# Patient Record
Sex: MALE | Race: WHITE | NOT HISPANIC OR LATINO | Employment: OTHER | URBAN - METROPOLITAN AREA
[De-identification: names, ages, dates, MRNs, and addresses within clinical notes are randomized per-mention and may not be internally consistent; named-entity substitution may affect disease eponyms.]

---

## 2023-02-12 PROBLEM — H26.491 POSTERIOR CAPSULAR OPACIFICATION VISUALLY SIGNIFICANT OF RIGHT EYE: Status: ACTIVE | Noted: 2023-02-12

## 2023-02-12 PROBLEM — R30.0 DYSURIA: Status: ACTIVE | Noted: 2023-02-12

## 2023-02-12 PROBLEM — Z98.42 HISTORY OF YAG LASER CAPSULOTOMY OF LENS OF LEFT EYE: Status: ACTIVE | Noted: 2023-02-12

## 2023-02-12 PROBLEM — S02.30XA: Status: ACTIVE | Noted: 2023-02-12

## 2023-02-12 PROBLEM — E61.1 IRON DEFICIENCY: Status: ACTIVE | Noted: 2023-02-12

## 2023-02-12 PROBLEM — Z96.1 PSEUDOPHAKIA OF RIGHT EYE: Status: ACTIVE | Noted: 2023-02-12

## 2023-02-12 PROBLEM — H53.2 BINOCULAR VISION DISORDER WITH DIPLOPIA: Status: ACTIVE | Noted: 2023-02-12

## 2023-02-12 PROBLEM — B96.81 HELICOBACTER POSITIVE GASTRITIS: Status: ACTIVE | Noted: 2023-02-12

## 2023-02-12 PROBLEM — H25.12 AGE-RELATED NUCLEAR CATARACT OF LEFT EYE: Status: ACTIVE | Noted: 2023-02-12

## 2023-02-12 PROBLEM — H25.041 POSTERIOR SUBCAPSULAR POLAR AGE-RELATED CATARACT OF RIGHT EYE: Status: ACTIVE | Noted: 2023-02-12

## 2023-02-12 PROBLEM — K21.9 GERD (GASTROESOPHAGEAL REFLUX DISEASE): Status: ACTIVE | Noted: 2023-02-12

## 2023-02-12 PROBLEM — E23.0 HYPOGONADOTROPIC HYPOGONADISM (MULTI): Status: ACTIVE | Noted: 2023-02-12

## 2023-02-12 PROBLEM — I10 BENIGN ESSENTIAL HYPERTENSION: Status: ACTIVE | Noted: 2023-02-12

## 2023-02-12 PROBLEM — H25.11 AGE-RELATED NUCLEAR CATARACT OF RIGHT EYE: Status: ACTIVE | Noted: 2023-02-12

## 2023-02-12 PROBLEM — E11.9 DIABETES MELLITUS (MULTI): Status: ACTIVE | Noted: 2023-02-12

## 2023-02-12 PROBLEM — Z98.41 HISTORY OF YAG LASER CAPSULOTOMY OF LENS OF RIGHT EYE: Status: ACTIVE | Noted: 2023-02-12

## 2023-02-12 PROBLEM — H50.30 XT (EXOTROPIA), INTERMITTENT: Status: ACTIVE | Noted: 2023-02-12

## 2023-02-12 PROBLEM — Z98.84 BARIATRIC SURGERY STATUS: Status: ACTIVE | Noted: 2023-02-12

## 2023-02-12 PROBLEM — E11.3299 DIABETIC RETINOPATHY, BACKGROUND (MULTI): Status: ACTIVE | Noted: 2023-02-12

## 2023-02-12 PROBLEM — T14.8XXA CONTUSION: Status: ACTIVE | Noted: 2023-02-12

## 2023-02-12 PROBLEM — H40.041 STEROID RESPONDER, RIGHT EYE: Status: ACTIVE | Noted: 2023-02-12

## 2023-02-12 PROBLEM — K90.9 INTESTINAL MALABSORPTION (HHS-HCC): Status: ACTIVE | Noted: 2023-02-12

## 2023-02-12 PROBLEM — L21.9 SEBORRHEIC DERMATITIS: Status: ACTIVE | Noted: 2023-02-12

## 2023-02-12 PROBLEM — N52.9 ERECTILE DYSFUNCTION: Status: ACTIVE | Noted: 2023-02-12

## 2023-02-12 PROBLEM — N20.0 KIDNEY STONE: Status: ACTIVE | Noted: 2023-02-12

## 2023-02-12 PROBLEM — H25.042 POSTERIOR SUBCAPSULAR POLAR AGE-RELATED CATARACT OF LEFT EYE: Status: ACTIVE | Noted: 2023-02-12

## 2023-02-12 PROBLEM — H02.839 DERMATOCHALASIS: Status: ACTIVE | Noted: 2023-02-12

## 2023-02-12 PROBLEM — Z96.1 PSEUDOPHAKIA OF LEFT EYE: Status: ACTIVE | Noted: 2023-02-12

## 2023-02-12 PROBLEM — E78.00 HYPERCHOLESTEROLEMIA: Status: ACTIVE | Noted: 2023-02-12

## 2023-02-12 PROBLEM — K29.70 HELICOBACTER POSITIVE GASTRITIS: Status: ACTIVE | Noted: 2023-02-12

## 2023-02-12 PROBLEM — H26.492 POSTERIOR CAPSULAR OPACIFICATION VISUALLY SIGNIFICANT OF LEFT EYE: Status: ACTIVE | Noted: 2023-02-12

## 2023-02-12 PROBLEM — G47.33 OBSTRUCTIVE SLEEP APNEA: Status: ACTIVE | Noted: 2023-02-12

## 2023-02-12 PROBLEM — H02.209 LAGOPHTHALMOS: Status: ACTIVE | Noted: 2023-02-12

## 2023-02-12 PROBLEM — R55 SYNCOPE: Status: ACTIVE | Noted: 2023-02-12

## 2023-02-12 PROBLEM — H50.21 HYPERTROPIA OF RIGHT EYE: Status: ACTIVE | Noted: 2023-02-12

## 2023-02-12 PROBLEM — M89.9 ABNORMAL MOVEMENT IN BONE: Status: ACTIVE | Noted: 2023-02-12

## 2023-02-12 RX ORDER — DIAZEPAM 5 MG/1
TABLET ORAL AS NEEDED
COMMUNITY
End: 2024-01-26 | Stop reason: ALTCHOICE

## 2023-02-12 RX ORDER — HYDROCORTISONE 25 MG/ML
LOTION TOPICAL 2 TIMES DAILY
COMMUNITY
Start: 2020-02-24 | End: 2023-03-20 | Stop reason: ALTCHOICE

## 2023-02-12 RX ORDER — OMEPRAZOLE 20 MG/1
20 CAPSULE, DELAYED RELEASE ORAL
COMMUNITY
Start: 2020-10-26

## 2023-02-12 RX ORDER — CHOLECALCIFEROL (VITAMIN D3) 25 MCG
25 TABLET ORAL 2 TIMES WEEKLY
COMMUNITY
Start: 2016-05-25

## 2023-02-12 RX ORDER — DIPHENHYDRAMINE HCL 25 MG
CAPSULE ORAL
COMMUNITY

## 2023-02-12 RX ORDER — RAMIPRIL 5 MG/1
1 CAPSULE ORAL DAILY
COMMUNITY
Start: 2018-08-13 | End: 2024-01-31

## 2023-02-12 RX ORDER — MAGNESIUM 200 MG
1 TABLET ORAL 2 TIMES WEEKLY
COMMUNITY
Start: 2014-07-31

## 2023-02-12 RX ORDER — SEMAGLUTIDE 1.34 MG/ML
1 INJECTION, SOLUTION SUBCUTANEOUS
COMMUNITY
Start: 2021-12-10 | End: 2023-11-07

## 2023-02-12 RX ORDER — FLUOXETINE HYDROCHLORIDE 20 MG/1
1 CAPSULE ORAL EVERY OTHER DAY
COMMUNITY
Start: 2012-04-06 | End: 2023-11-13

## 2023-02-12 RX ORDER — GABAPENTIN 300 MG/1
1 CAPSULE ORAL 2 TIMES DAILY
COMMUNITY
Start: 2020-10-26 | End: 2023-05-08

## 2023-02-12 RX ORDER — SIMVASTATIN 20 MG/1
1 TABLET, FILM COATED ORAL EVERY EVENING
COMMUNITY
Start: 2012-04-06 | End: 2024-01-31

## 2023-02-12 RX ORDER — FERROUS SULFATE 325(65) MG
65 TABLET, DELAYED RELEASE (ENTERIC COATED) ORAL 2 TIMES WEEKLY
COMMUNITY
Start: 2015-10-21

## 2023-02-12 RX ORDER — LORATADINE 10 MG/1
TABLET ORAL
COMMUNITY
End: 2023-03-20 | Stop reason: ALTCHOICE

## 2023-02-12 RX ORDER — SILDENAFIL 50 MG/1
1-2 TABLET, FILM COATED ORAL
COMMUNITY
Start: 2022-04-20

## 2023-02-12 RX ORDER — METFORMIN HYDROCHLORIDE 1000 MG/1
1 TABLET ORAL
COMMUNITY
Start: 2012-04-30 | End: 2024-01-31

## 2023-03-13 LAB
ALANINE AMINOTRANSFERASE (SGPT) (U/L) IN SER/PLAS: 11 U/L (ref 10–52)
ALBUMIN (G/DL) IN SER/PLAS: 4 G/DL (ref 3.4–5)
ALKALINE PHOSPHATASE (U/L) IN SER/PLAS: 44 U/L (ref 33–136)
ANION GAP IN SER/PLAS: 12 MMOL/L (ref 10–20)
ASPARTATE AMINOTRANSFERASE (SGOT) (U/L) IN SER/PLAS: 12 U/L (ref 9–39)
BASOPHILS (10*3/UL) IN BLOOD BY AUTOMATED COUNT: 0.03 X10E9/L (ref 0–0.1)
BASOPHILS/100 LEUKOCYTES IN BLOOD BY AUTOMATED COUNT: 0.4 % (ref 0–2)
BILIRUBIN TOTAL (MG/DL) IN SER/PLAS: 0.8 MG/DL (ref 0–1.2)
CALCIUM (MG/DL) IN SER/PLAS: 9.9 MG/DL (ref 8.6–10.6)
CARBON DIOXIDE, TOTAL (MMOL/L) IN SER/PLAS: 31 MMOL/L (ref 21–32)
CHLORIDE (MMOL/L) IN SER/PLAS: 102 MMOL/L (ref 98–107)
CHOLESTEROL (MG/DL) IN SER/PLAS: 146 MG/DL (ref 0–199)
CHOLESTEROL IN HDL (MG/DL) IN SER/PLAS: 68.4 MG/DL
CHOLESTEROL/HDL RATIO: 2.1
CREATININE (MG/DL) IN SER/PLAS: 0.83 MG/DL (ref 0.5–1.3)
EOSINOPHILS (10*3/UL) IN BLOOD BY AUTOMATED COUNT: 0.23 X10E9/L (ref 0–0.4)
EOSINOPHILS/100 LEUKOCYTES IN BLOOD BY AUTOMATED COUNT: 3.2 % (ref 0–6)
ERYTHROCYTE DISTRIBUTION WIDTH (RATIO) BY AUTOMATED COUNT: 13.3 % (ref 11.5–14.5)
ERYTHROCYTE MEAN CORPUSCULAR HEMOGLOBIN CONCENTRATION (G/DL) BY AUTOMATED: 31.3 G/DL (ref 32–36)
ERYTHROCYTE MEAN CORPUSCULAR VOLUME (FL) BY AUTOMATED COUNT: 93 FL (ref 80–100)
ERYTHROCYTES (10*6/UL) IN BLOOD BY AUTOMATED COUNT: 5.02 X10E12/L (ref 4.5–5.9)
ESTIMATED AVERAGE GLUCOSE FOR HBA1C: 128 MG/DL
GFR MALE: 90 ML/MIN/1.73M2
GLUCOSE (MG/DL) IN SER/PLAS: 102 MG/DL (ref 74–99)
HEMATOCRIT (%) IN BLOOD BY AUTOMATED COUNT: 46.7 % (ref 41–52)
HEMOGLOBIN (G/DL) IN BLOOD: 14.6 G/DL (ref 13.5–17.5)
HEMOGLOBIN A1C/HEMOGLOBIN TOTAL IN BLOOD: 6.1 %
IMMATURE GRANULOCYTES/100 LEUKOCYTES IN BLOOD BY AUTOMATED COUNT: 0 % (ref 0–0.9)
LDL: 55 MG/DL (ref 0–99)
LEUKOCYTES (10*3/UL) IN BLOOD BY AUTOMATED COUNT: 7.1 X10E9/L (ref 4.4–11.3)
LYMPHOCYTES (10*3/UL) IN BLOOD BY AUTOMATED COUNT: 2.87 X10E9/L (ref 0.8–3)
LYMPHOCYTES/100 LEUKOCYTES IN BLOOD BY AUTOMATED COUNT: 40.3 % (ref 13–44)
MONOCYTES (10*3/UL) IN BLOOD BY AUTOMATED COUNT: 0.55 X10E9/L (ref 0.05–0.8)
MONOCYTES/100 LEUKOCYTES IN BLOOD BY AUTOMATED COUNT: 7.7 % (ref 2–10)
NEUTROPHILS (10*3/UL) IN BLOOD BY AUTOMATED COUNT: 3.45 X10E9/L (ref 1.6–5.5)
NEUTROPHILS/100 LEUKOCYTES IN BLOOD BY AUTOMATED COUNT: 48.4 % (ref 40–80)
NRBC (PER 100 WBCS) BY AUTOMATED COUNT: 0 /100 WBC (ref 0–0)
PLATELETS (10*3/UL) IN BLOOD AUTOMATED COUNT: 159 X10E9/L (ref 150–450)
POTASSIUM (MMOL/L) IN SER/PLAS: 3.9 MMOL/L (ref 3.5–5.3)
PROTEIN TOTAL: 6.5 G/DL (ref 6.4–8.2)
SODIUM (MMOL/L) IN SER/PLAS: 141 MMOL/L (ref 136–145)
TRIGLYCERIDE (MG/DL) IN SER/PLAS: 113 MG/DL (ref 0–149)
UREA NITROGEN (MG/DL) IN SER/PLAS: 13 MG/DL (ref 6–23)
VLDL: 23 MG/DL (ref 0–40)

## 2023-03-20 ENCOUNTER — OFFICE VISIT (OUTPATIENT)
Dept: PRIMARY CARE | Facility: CLINIC | Age: 78
End: 2023-03-20
Payer: MEDICARE

## 2023-03-20 VITALS
DIASTOLIC BLOOD PRESSURE: 68 MMHG | WEIGHT: 160.6 LBS | HEIGHT: 69 IN | BODY MASS INDEX: 23.79 KG/M2 | SYSTOLIC BLOOD PRESSURE: 115 MMHG | TEMPERATURE: 97.5 F | HEART RATE: 86 BPM
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
## 2023-03-20 DIAGNOSIS — E11.3299 DIABETIC RETINOPATHY, BACKGROUND (MULTI): Primary | ICD-10-CM

## 2023-03-20 PROCEDURE — 3078F DIAST BP <80 MM HG: CPT | Performed by: INTERNAL MEDICINE

## 2023-03-20 PROCEDURE — 1159F MED LIST DOCD IN RCRD: CPT | Performed by: INTERNAL MEDICINE

## 2023-03-20 PROCEDURE — 3074F SYST BP LT 130 MM HG: CPT | Performed by: INTERNAL MEDICINE

## 2023-03-20 PROCEDURE — 1036F TOBACCO NON-USER: CPT | Performed by: INTERNAL MEDICINE

## 2023-03-20 PROCEDURE — 99214 OFFICE O/P EST MOD 30 MIN: CPT | Performed by: INTERNAL MEDICINE

## 2023-03-20 PROCEDURE — 1160F RVW MEDS BY RX/DR IN RCRD: CPT | Performed by: INTERNAL MEDICINE

## 2023-03-20 RX ORDER — FERROUS SULFATE 325(65) MG
TABLET ORAL
COMMUNITY

## 2023-03-20 RX ORDER — FLASH GLUCOSE SENSOR
KIT MISCELLANEOUS
COMMUNITY
Start: 2023-03-03

## 2023-03-20 RX ORDER — CETIRIZINE HYDROCHLORIDE 10 MG/1
TABLET ORAL
COMMUNITY
End: 2024-01-26 | Stop reason: ALTCHOICE

## 2023-03-20 RX ORDER — VIT C/E/ZN/COPPR/LUTEIN/ZEAXAN 250MG-90MG
CAPSULE ORAL
COMMUNITY

## 2023-03-20 ASSESSMENT — PAIN SCALES - GENERAL: PAINLEVEL: 0-NO PAIN

## 2023-03-20 ASSESSMENT — ENCOUNTER SYMPTOMS: DIABETIC ASSOCIATED SYMPTOMS: 0

## 2023-03-20 NOTE — ASSESSMENT & PLAN NOTE
Improving diabetes mellitus with complications based on lab values and symptoms. Continue established treatment plan including control of risk factors. Follow up at least yearly

## 2023-03-20 NOTE — PROGRESS NOTES
Subjective   Patient ID: Boni Gongora is a 77 y.o. male who presents for Diabetes.  Boni has fallen twice and has needed assistance in arising.  Both falls were trips. He did not faint.   He asks if he should have some physical therapy to improve his overall strength.    Diabetes  He presents for his follow-up diabetic visit. He has type 2 diabetes mellitus. There are no hypoglycemic associated symptoms. There are no diabetic associated symptoms. There are no hypoglycemic complications. Symptoms are stable. Diabetic complications include retinopathy. He is compliant with treatment all of the time. His weight is decreasing steadily. He is following a diabetic and generally healthy diet. He never participates in exercise. An ACE inhibitor/angiotensin II receptor blocker is being taken. He sees a podiatrist.Eye exam is current.     Current Outpatient Medications on File Prior to Visit   Medication Sig Dispense Refill    cetirizine (ZyrTEC) 10 mg tablet Take 1 tablet by mouth every day  Indications: allergy prevention      cholecalciferol (Vitamin D-3) 25 MCG (1000 UT) capsule Take as directed twice per week  Indications: bariatric procedure      cholecalciferol (Vitamin D-3) 25 MCG (1000 UT) tablet Take 1 tablet (25 mcg) by mouth 2 times a week.      cyanocobalamin, vitamin B-12, 1,000 mcg tablet, sublingual Place 1 tablet under the tongue 2 (two) times a week.      diazePAM (Valium) 5 mg tablet if needed (to visit dentist office).      diphenhydrAMINE (BENADryl) 25 mg capsule       empagliflozin (Jardiance) 25 mg Take 1 tablet (25 mg) by mouth once daily.      ferrous sulfate 325 (65 Fe) MG EC tablet Take 1 tablet (65 mg) by mouth 2 times a week.      ferrous sulfate 325 (65 Fe) MG tablet Take as directed twice per week  Indications: bariatric procedure      flash glucose sensor (FREESTYLE MELVIN 2 SENSOR [facility]) Apply 1 sensor every 14 days to the back of upper arm      FLUoxetine (PROzac) 20 mg capsule Take 1  capsule (20 mg) by mouth every other day.      FreeStyle Agustin 2 Sensor kit APPLY 1 SENSOR EVERY 14 DAYS TO THE BACK OF UPPER ARM.      gabapentin (Neurontin) 300 mg capsule Take 1 capsule (300 mg) by mouth in the morning and 1 capsule (300 mg) before bedtime.      metFORMIN (Glucophage) 1,000 mg tablet Take 1 tablet (1,000 mg) by mouth in the morning and 1 tablet (1,000 mg) in the evening. Take with meals.      MULTIVITAMIN ORAL Take 1 tablet by mouth 1 (one) time each day.      omeprazole (PriLOSEC) 20 mg DR capsule Take 1 capsule (20 mg) by mouth once daily in the morning. Take before meals.      ramipril (Altace) 5 mg capsule Take 1 capsule (5 mg) by mouth once daily.      semaglutide (Ozempic) 1 mg/dose (4 mg/3 mL) pen injector Inject 0.75 mL (1 mg) under the skin 1 (one) time per week.      sildenafil (Viagra) 50 mg tablet Take 1-2 tablets ( mg) by mouth. 30 minutes before sexual activity      simvastatin (Zocor) 20 mg tablet Take 1 tablet (20 mg) by mouth once daily in the evening.      VITAMIN B COMPLEX ORAL Take as directed twice per week  Indications: bariatric procecdure      hydrocortisone 2.5 % lotion twice a day. Apply sparingly to affected area(s)      loratadine (Claritin) 10 mg tablet        No current facility-administered medications on file prior to visit.      Review of Systems   All other systems reviewed and are negative.      Objective   Physical Exam  Constitutional:       Appearance: Normal appearance.   HENT:      Head: Normocephalic.   Eyes:      Pupils: Pupils are equal, round, and reactive to light.   Cardiovascular:      Rate and Rhythm: Normal rate and regular rhythm.   Pulmonary:      Effort: Pulmonary effort is normal.      Breath sounds: Normal breath sounds.   Abdominal:      General: Abdomen is flat. Bowel sounds are normal.      Palpations: Abdomen is soft. There is no mass.      Tenderness: There is no abdominal tenderness.   Feet:      Right foot:      Protective  Sensation: 5 sites tested.  4 sites sensed.      Skin integrity: Skin integrity normal.      Toenail Condition: Right toenails are normal.      Left foot:      Protective Sensation: 5 sites tested.  4 sites sensed.      Skin integrity: Skin integrity normal.      Toenail Condition: Left toenails are normal.      Comments: Reduced filament in hallux B  Skin:     General: Skin is warm and dry.   Neurological:      Mental Status: He is alert.       No visits with results within 1 Week(s) from this visit.   Latest known visit with results is:   Orders Only on 03/13/2023   Component Date Value Ref Range Status    Cholesterol 03/13/2023 146  0 - 199 mg/dL Final    Comment: .      AGE      DESIRABLE   BORDERLINE HIGH   HIGH     0-19 Y     0 - 169       170 - 199     >/= 200    20-24 Y     0 - 189       190 - 224     >/= 225         >24 Y     0 - 199       200 - 239     >/= 240   **All ranges are based on fasting samples. Specific   therapeutic targets will vary based on patient-specific   cardiac risk.  .   Pediatric guidelines reference:Pediatrics 2011, 128(S5).   Adult guidelines reference: NCEP ATPIII Guidelines,     ADAM 2001, 258:2486-97  .   Venipuncture immediately after or during the    administration of Metamizole may lead to falsely   low results. Testing should be performed immediately   prior to Metamizole dosing.      HDL 03/13/2023 68.4  mg/dL Final    Comment: .      AGE      VERY LOW   LOW     NORMAL    HIGH       0-19 Y       < 35   < 40     40-45     ----    20-24 Y       ----   < 40       >45     ----      >24 Y       ----   < 40     40-60      >60  .      Cholesterol/HDL Ratio 03/13/2023 2.1   Final    Comment: REF VALUES  DESIRABLE  < 3.4  HIGH RISK  > 5.0      LDL 03/13/2023 55  0 - 99 mg/dL Final    Comment: .                           NEAR      BORD      AGE      DESIRABLE  OPTIMAL    HIGH     HIGH     VERY HIGH     0-19 Y     0 - 109     ---    110-129   >/= 130     ----    20-24 Y     0 - 119      ---    120-159   >/= 160     ----      >24 Y     0 -  99   100-129  130-159   160-189     >/=190  .      VLDL 03/13/2023 23  0 - 40 mg/dL Final    Triglycerides 03/13/2023 113  0 - 149 mg/dL Final    Comment: .      AGE      DESIRABLE   BORDERLINE HIGH   HIGH     VERY HIGH   0 D-90 D    19 - 174         ----         ----        ----  91 D- 9 Y     0 -  74        75 -  99     >/= 100      ----    10-19 Y     0 -  89        90 - 129     >/= 130      ----    20-24 Y     0 - 114       115 - 149     >/= 150      ----         >24 Y     0 - 149       150 - 199    200- 499    >/= 500  .   Venipuncture immediately after or during the    administration of Metamizole may lead to falsely   low results. Testing should be performed immediately   prior to Metamizole dosing.         Assessment/Plan   Problem List Items Addressed This Visit          Endocrine/Metabolic    Diabetic retinopathy, background (CMS/[facility]) - Primary     Improving diabetes mellitus with complications based on lab values and symptoms. Continue established treatment plan including control of risk factors. Follow up at least yearly           Other Visit Diagnoses       Fall, subsequent encounter            Will refer for physical therapy.

## 2023-05-05 DIAGNOSIS — E11.40 TYPE 2 DIABETES MELLITUS WITH DIABETIC NEUROPATHY, WITHOUT LONG-TERM CURRENT USE OF INSULIN (MULTI): Primary | ICD-10-CM

## 2023-05-08 RX ORDER — GABAPENTIN 300 MG/1
CAPSULE ORAL
Qty: 180 CAPSULE | Refills: 0 | Status: SHIPPED | OUTPATIENT
Start: 2023-05-08 | End: 2023-06-05

## 2023-06-02 DIAGNOSIS — E11.40 TYPE 2 DIABETES MELLITUS WITH DIABETIC NEUROPATHY, WITHOUT LONG-TERM CURRENT USE OF INSULIN (MULTI): ICD-10-CM

## 2023-06-05 RX ORDER — GABAPENTIN 300 MG/1
CAPSULE ORAL
Qty: 180 CAPSULE | Refills: 0 | Status: SHIPPED | OUTPATIENT
Start: 2023-06-05 | End: 2023-09-02

## 2023-08-23 DIAGNOSIS — Z12.5 PROSTATE CANCER SCREENING: ICD-10-CM

## 2023-08-23 DIAGNOSIS — E11.3299 DIABETIC RETINOPATHY, BACKGROUND (MULTI): Primary | ICD-10-CM

## 2023-09-01 DIAGNOSIS — E11.40 TYPE 2 DIABETES MELLITUS WITH DIABETIC NEUROPATHY, WITHOUT LONG-TERM CURRENT USE OF INSULIN (MULTI): ICD-10-CM

## 2023-09-02 RX ORDER — GABAPENTIN 300 MG/1
CAPSULE ORAL
Qty: 180 CAPSULE | Refills: 0 | Status: SHIPPED | OUTPATIENT
Start: 2023-09-02 | End: 2023-11-08

## 2023-09-05 ENCOUNTER — LAB (OUTPATIENT)
Dept: LAB | Facility: LAB | Age: 78
End: 2023-09-05
Payer: MEDICARE

## 2023-09-05 DIAGNOSIS — Z12.5 PROSTATE CANCER SCREENING: ICD-10-CM

## 2023-09-05 DIAGNOSIS — E11.3299 DIABETIC RETINOPATHY, BACKGROUND (MULTI): ICD-10-CM

## 2023-09-05 LAB
ALANINE AMINOTRANSFERASE (SGPT) (U/L) IN SER/PLAS: 10 U/L (ref 10–52)
ALBUMIN (G/DL) IN SER/PLAS: 4.2 G/DL (ref 3.4–5)
ALBUMIN (MG/L) IN URINE: 7.6 MG/L
ALBUMIN/CREATININE (UG/MG) IN URINE: 10.5 UG/MG CRT (ref 0–30)
ALKALINE PHOSPHATASE (U/L) IN SER/PLAS: 48 U/L (ref 33–136)
ANION GAP IN SER/PLAS: 16 MMOL/L (ref 10–20)
ASPARTATE AMINOTRANSFERASE (SGOT) (U/L) IN SER/PLAS: 9 U/L (ref 9–39)
BASOPHILS (10*3/UL) IN BLOOD BY AUTOMATED COUNT: 0.03 X10E9/L (ref 0–0.1)
BASOPHILS/100 LEUKOCYTES IN BLOOD BY AUTOMATED COUNT: 0.5 % (ref 0–2)
BILIRUBIN TOTAL (MG/DL) IN SER/PLAS: 0.6 MG/DL (ref 0–1.2)
CALCIUM (MG/DL) IN SER/PLAS: 10.3 MG/DL (ref 8.6–10.6)
CARBON DIOXIDE, TOTAL (MMOL/L) IN SER/PLAS: 29 MMOL/L (ref 21–32)
CHLORIDE (MMOL/L) IN SER/PLAS: 105 MMOL/L (ref 98–107)
CHOLESTEROL (MG/DL) IN SER/PLAS: 134 MG/DL (ref 0–199)
CHOLESTEROL IN HDL (MG/DL) IN SER/PLAS: 58.8 MG/DL
CHOLESTEROL/HDL RATIO: 2.3
CREATININE (MG/DL) IN SER/PLAS: 0.85 MG/DL (ref 0.5–1.3)
CREATININE (MG/DL) IN URINE: 72.7 MG/DL (ref 20–370)
EOSINOPHILS (10*3/UL) IN BLOOD BY AUTOMATED COUNT: 0.2 X10E9/L (ref 0–0.4)
EOSINOPHILS/100 LEUKOCYTES IN BLOOD BY AUTOMATED COUNT: 3.3 % (ref 0–6)
ERYTHROCYTE DISTRIBUTION WIDTH (RATIO) BY AUTOMATED COUNT: 13.2 % (ref 11.5–14.5)
ERYTHROCYTE MEAN CORPUSCULAR HEMOGLOBIN CONCENTRATION (G/DL) BY AUTOMATED: 31.8 G/DL (ref 32–36)
ERYTHROCYTE MEAN CORPUSCULAR VOLUME (FL) BY AUTOMATED COUNT: 94 FL (ref 80–100)
ERYTHROCYTES (10*6/UL) IN BLOOD BY AUTOMATED COUNT: 4.96 X10E12/L (ref 4.5–5.9)
ESTIMATED AVERAGE GLUCOSE FOR HBA1C: 126 MG/DL
GFR MALE: 89 ML/MIN/1.73M2
GLUCOSE (MG/DL) IN SER/PLAS: 115 MG/DL (ref 74–99)
HEMATOCRIT (%) IN BLOOD BY AUTOMATED COUNT: 46.8 % (ref 41–52)
HEMOGLOBIN (G/DL) IN BLOOD: 14.9 G/DL (ref 13.5–17.5)
HEMOGLOBIN A1C/HEMOGLOBIN TOTAL IN BLOOD: 6 %
IMMATURE GRANULOCYTES/100 LEUKOCYTES IN BLOOD BY AUTOMATED COUNT: 0.2 % (ref 0–0.9)
LDL: 60 MG/DL (ref 0–99)
LEUKOCYTES (10*3/UL) IN BLOOD BY AUTOMATED COUNT: 6 X10E9/L (ref 4.4–11.3)
LYMPHOCYTES (10*3/UL) IN BLOOD BY AUTOMATED COUNT: 2.16 X10E9/L (ref 0.8–3)
LYMPHOCYTES/100 LEUKOCYTES IN BLOOD BY AUTOMATED COUNT: 36.1 % (ref 13–44)
MONOCYTES (10*3/UL) IN BLOOD BY AUTOMATED COUNT: 0.53 X10E9/L (ref 0.05–0.8)
MONOCYTES/100 LEUKOCYTES IN BLOOD BY AUTOMATED COUNT: 8.8 % (ref 2–10)
NEUTROPHILS (10*3/UL) IN BLOOD BY AUTOMATED COUNT: 3.06 X10E9/L (ref 1.6–5.5)
NEUTROPHILS/100 LEUKOCYTES IN BLOOD BY AUTOMATED COUNT: 51.1 % (ref 40–80)
NRBC (PER 100 WBCS) BY AUTOMATED COUNT: 0 /100 WBC (ref 0–0)
PLATELETS (10*3/UL) IN BLOOD AUTOMATED COUNT: 146 X10E9/L (ref 150–450)
POTASSIUM (MMOL/L) IN SER/PLAS: 4.7 MMOL/L (ref 3.5–5.3)
PROSTATE SPECIFIC ANTIGEN,SCREEN: 7.85 NG/ML (ref 0–4)
PROTEIN TOTAL: 6.8 G/DL (ref 6.4–8.2)
SODIUM (MMOL/L) IN SER/PLAS: 145 MMOL/L (ref 136–145)
TRIGLYCERIDE (MG/DL) IN SER/PLAS: 77 MG/DL (ref 0–149)
UREA NITROGEN (MG/DL) IN SER/PLAS: 18 MG/DL (ref 6–23)
VLDL: 15 MG/DL (ref 0–40)

## 2023-09-05 PROCEDURE — 82043 UR ALBUMIN QUANTITATIVE: CPT

## 2023-09-05 PROCEDURE — 82570 ASSAY OF URINE CREATININE: CPT

## 2023-09-05 PROCEDURE — 36415 COLL VENOUS BLD VENIPUNCTURE: CPT

## 2023-09-05 PROCEDURE — 80061 LIPID PANEL: CPT

## 2023-09-05 PROCEDURE — 85025 COMPLETE CBC W/AUTO DIFF WBC: CPT

## 2023-09-05 PROCEDURE — G0103 PSA SCREENING: HCPCS

## 2023-09-05 PROCEDURE — 83036 HEMOGLOBIN GLYCOSYLATED A1C: CPT

## 2023-09-05 PROCEDURE — 80053 COMPREHEN METABOLIC PANEL: CPT

## 2023-09-07 DIAGNOSIS — R97.20 ELEVATED PSA: Primary | ICD-10-CM

## 2023-09-07 NOTE — RESULT ENCOUNTER NOTE
Additionally,  MRI for further evaluation may not be covered by Medicare. I have heard that  offers this at a highly discounted rate. You should clearly find out all about this before you undergo the test, in order to prevent any rude financial surprises.  Darrick

## 2023-09-07 NOTE — RESULT ENCOUNTER NOTE
"Boni,  When ordering your labs, I noticed that since you do not usually have annual exams, I had not run a PSA in many years. When you did have your most recent wellness exam, I indicated that a PSA was \"not indicated\" due to your age >75.    In any event I included the PSA this time and it is now elevated. I think this requires additional evaluation. I would like to start with an MRI of your prostate to see if there are any \"lesions\" in the prostate which have the appearance of prostate cancer. If there are any, I would want you to speak to a urologist about the wisdom of additional evaluation.    Certainly, this test result is NOT a diagnosis of prostate cancer and there are many men who have elevations of PSA which are NOT due to cancer.    The remainder of your testing is satisfactory.  Do not hesitate to contact me if you have questions or concerns.    Darrick"

## 2023-09-11 ENCOUNTER — OFFICE VISIT (OUTPATIENT)
Dept: PRIMARY CARE | Facility: CLINIC | Age: 78
End: 2023-09-11
Payer: MEDICARE

## 2023-09-11 VITALS — WEIGHT: 159 LBS | BODY MASS INDEX: 23.48 KG/M2

## 2023-09-11 DIAGNOSIS — R97.20 ELEVATED PSA: ICD-10-CM

## 2023-09-11 DIAGNOSIS — Z98.84 BARIATRIC SURGERY STATUS: ICD-10-CM

## 2023-09-11 DIAGNOSIS — E11.42 TYPE 2 DIABETES MELLITUS WITH DIABETIC POLYNEUROPATHY, WITHOUT LONG-TERM CURRENT USE OF INSULIN (MULTI): Primary | ICD-10-CM

## 2023-09-11 DIAGNOSIS — I10 BENIGN ESSENTIAL HYPERTENSION: ICD-10-CM

## 2023-09-11 PROCEDURE — 1160F RVW MEDS BY RX/DR IN RCRD: CPT | Performed by: INTERNAL MEDICINE

## 2023-09-11 PROCEDURE — 1126F AMNT PAIN NOTED NONE PRSNT: CPT | Performed by: INTERNAL MEDICINE

## 2023-09-11 PROCEDURE — 1159F MED LIST DOCD IN RCRD: CPT | Performed by: INTERNAL MEDICINE

## 2023-09-11 PROCEDURE — 1036F TOBACCO NON-USER: CPT | Performed by: INTERNAL MEDICINE

## 2023-09-11 PROCEDURE — 99214 OFFICE O/P EST MOD 30 MIN: CPT | Performed by: INTERNAL MEDICINE

## 2023-09-11 RX ORDER — GABAPENTIN 100 MG/1
CAPSULE ORAL
Qty: 90 CAPSULE | Refills: 2 | Status: SHIPPED | OUTPATIENT
Start: 2023-09-11 | End: 2023-12-18 | Stop reason: DRUGHIGH

## 2023-09-11 NOTE — PROGRESS NOTES
Subjective   Patient ID: Boni Gongora is a 78 y.o. male who presents for No chief complaint on file..  Boni has completed PT and has not fallen. The therapist wondered if the proprioceptive loss from his neuropathy sx were part of the etiology of falls.  Boni notes that by evening, he occasionally has paresthesia in the evening which affects his ability to sleep. His current dosing is around 4PM and second dose is around 11:30PM. Bedtime is about 12:30AM.  No nocturia,  He has an elevated PSA and I have ordered an MRI      Current Outpatient Medications   Medication Instructions    cetirizine (ZyrTEC) 10 mg tablet Take 1 tablet by mouth every day  Indications: allergy prevention    cholecalciferol (Vitamin D-3) 25 MCG (1000 UT) capsule Take as directed twice per week  Indications: bariatric procedure    cholecalciferol (VITAMIN D-3) 25 mcg, oral, 2 times weekly    cyanocobalamin, vitamin B-12, 1,000 mcg tablet, sublingual 1 tablet, sublingual, 2 times weekly    diazePAM (Valium) 5 mg tablet As needed    diphenhydrAMINE (BENADryl) 25 mg capsule No dose, route, or frequency recorded.    empagliflozin (JARDIANCE) 25 mg, oral, Daily    ferrous sulfate 325 (65 Fe) MG tablet Take as directed twice per week  Indications: bariatric procedure    ferrous sulfate 65 mg, oral, 2 times weekly    flash glucose sensor (FREESTYLE MELVIN 2 SENSOR [facility]) Apply 1 sensor every 14 days to the back of upper arm    FLUoxetine (PROzac) 20 mg capsule 1 capsule, oral, Every other day    FreeStyle Melvin 2 Sensor kit APPLY 1 SENSOR EVERY 14 DAYS TO THE BACK OF UPPER ARM.    gabapentin (Neurontin) 300 mg capsule TAKE 1 CAPSULE BY MOUTH TWICE A DAY    metFORMIN (Glucophage) 1,000 mg tablet 1 tablet, oral, 2 times daily with meals    MULTIVITAMIN ORAL 1 tablet, oral, Daily    omeprazole (PRILOSEC) 20 mg, oral, Daily before breakfast    Ozempic 1 mg, subcutaneous, Weekly    ramipril (Altace) 5 mg capsule 1 capsule, oral, Daily     sildenafil (Viagra) 50 mg tablet 1-2 tablets, oral, 30 minutes before sexual activity    simvastatin (Zocor) 20 mg tablet 1 tablet, oral, Every evening    VITAMIN B COMPLEX ORAL Take as directed twice per week  Indications: bariatric procecdure     Review of Systems   All other systems reviewed and are negative.      Objective   Physical Exam  Constitutional:       Appearance: Normal appearance.   HENT:      Head: Normocephalic and atraumatic.   Cardiovascular:      Rate and Rhythm: Normal rate and regular rhythm.   Pulmonary:      Effort: Pulmonary effort is normal.      Breath sounds: Normal breath sounds.   Feet:      Right foot:      Protective Sensation: 3 sites tested.   1 site sensed.     Skin integrity: Skin integrity normal. No ulcer.      Left foot:      Protective Sensation: 3 sites tested.   1 site sensed.     Skin integrity: Skin integrity normal. No ulcer.   Neurological:      Mental Status: He is alert.           Assessment/Plan   Problem List Items Addressed This Visit       Bariatric surgery status     Doing well.         Benign essential hypertension     Well controlled. Continue Current Management         Diabetes mellitus (CMS/HCC) - Primary     May increase HS dose of gabapentin at 100mg increment up to 300mg to see if we can uptitrate the dose effectively..  Likes his Agustin 2 sensor.  Glucose control is good.         Relevant Medications    gabapentin (Neurontin) 100 mg capsule    Elevated PSA     MRI of prostate recommended.

## 2023-09-11 NOTE — ASSESSMENT & PLAN NOTE
>>ASSESSMENT AND PLAN FOR ELEVATED PSA WRITTEN ON 9/11/2023 11:44 AM BY MISHEL WU MD    MRI of prostate recommended.

## 2023-09-11 NOTE — ASSESSMENT & PLAN NOTE
May increase HS dose of gabapentin at 100mg increment up to 300mg to see if we can uptitrate the dose effectively..  Likes his Agustin 2 sensor.  Glucose control is good.

## 2023-09-26 ENCOUNTER — TELEPHONE (OUTPATIENT)
Dept: PRIMARY CARE | Facility: CLINIC | Age: 78
End: 2023-09-26
Payer: MEDICARE

## 2023-11-01 DIAGNOSIS — F41.9 ANXIETY: Primary | ICD-10-CM

## 2023-11-06 DIAGNOSIS — E11.42 TYPE 2 DIABETES MELLITUS WITH DIABETIC POLYNEUROPATHY, WITHOUT LONG-TERM CURRENT USE OF INSULIN (MULTI): Primary | ICD-10-CM

## 2023-11-06 DIAGNOSIS — E11.40 TYPE 2 DIABETES MELLITUS WITH DIABETIC NEUROPATHY, WITHOUT LONG-TERM CURRENT USE OF INSULIN (MULTI): ICD-10-CM

## 2023-11-07 ENCOUNTER — HOSPITAL ENCOUNTER (OUTPATIENT)
Dept: RADIOLOGY | Facility: HOSPITAL | Age: 78
Discharge: HOME | End: 2023-11-07
Payer: MEDICARE

## 2023-11-07 DIAGNOSIS — R97.20 ELEVATED PROSTATE SPECIFIC ANTIGEN (PSA): ICD-10-CM

## 2023-11-07 PROCEDURE — 6100000002 HC SELF-PAY SCREENING FAST MRI PELVIC

## 2023-11-07 RX ORDER — SEMAGLUTIDE 1.34 MG/ML
1 INJECTION, SOLUTION SUBCUTANEOUS
Qty: 9 ML | Refills: 3 | Status: SHIPPED | OUTPATIENT
Start: 2023-11-07

## 2023-11-08 RX ORDER — GABAPENTIN 300 MG/1
CAPSULE ORAL
Qty: 180 CAPSULE | Refills: 0 | Status: SHIPPED | OUTPATIENT
Start: 2023-11-08 | End: 2024-03-29

## 2023-11-11 NOTE — RESULT ENCOUNTER NOTE
Boni,  I am sure you have read the result.  My interpretation of these findings is that you have a an area in your prostate which is suspicious for prostate cancer.  I believe that you should undergo a biopsy, and be evaluated by a urologist.    If you turn out to have prostate cancer on biopsy, there are many options for determining a treatment course.  This would usually involve consultation with a urologist, a radiation oncologist, and at times a medical oncologist.  Some men do not require any treatment because of the unique biology that their tumors have.  These men may be safely observed for signs of more aggressive types of cancer.    There is a long way to go before a specific diagnosis and treatment plan can be properly decided upon.  It is also possible that this will not represent cancer.    In the recent past, I have asked my patients who have the situation to consult either Dr. Damian Josue, or Dr. Jose David Gordon (Yoni).  Their phone number is [phone].    I know that these are distressing and important concerns.  I will remain available to you to discuss your health at any time.  If it is of any comfort, I do not believe that whatever is going on in your prostate will kill you.    Darrick

## 2023-11-13 RX ORDER — FLUOXETINE HYDROCHLORIDE 20 MG/1
20 CAPSULE ORAL EVERY OTHER DAY
Qty: 45 CAPSULE | Refills: 3 | Status: SHIPPED | OUTPATIENT
Start: 2023-11-13

## 2023-12-11 DIAGNOSIS — E11.42 TYPE 2 DIABETES MELLITUS WITH DIABETIC POLYNEUROPATHY, WITHOUT LONG-TERM CURRENT USE OF INSULIN (MULTI): Primary | ICD-10-CM

## 2023-12-14 ENCOUNTER — LAB (OUTPATIENT)
Dept: LAB | Facility: LAB | Age: 78
End: 2023-12-14
Payer: MEDICARE

## 2023-12-14 DIAGNOSIS — E11.42 TYPE 2 DIABETES MELLITUS WITH DIABETIC POLYNEUROPATHY, WITHOUT LONG-TERM CURRENT USE OF INSULIN (MULTI): ICD-10-CM

## 2023-12-14 LAB
EST. AVERAGE GLUCOSE BLD GHB EST-MCNC: 126 MG/DL
HBA1C MFR BLD: 6 %

## 2023-12-14 PROCEDURE — 83036 HEMOGLOBIN GLYCOSYLATED A1C: CPT

## 2023-12-14 PROCEDURE — 36415 COLL VENOUS BLD VENIPUNCTURE: CPT

## 2023-12-18 ENCOUNTER — OFFICE VISIT (OUTPATIENT)
Dept: PRIMARY CARE | Facility: CLINIC | Age: 78
End: 2023-12-18
Payer: MEDICARE

## 2023-12-18 VITALS
BODY MASS INDEX: 24.22 KG/M2 | WEIGHT: 164 LBS | HEART RATE: 84 BPM | DIASTOLIC BLOOD PRESSURE: 69 MMHG | TEMPERATURE: 97.6 F | SYSTOLIC BLOOD PRESSURE: 110 MMHG

## 2023-12-18 DIAGNOSIS — R97.20 ELEVATED PSA: ICD-10-CM

## 2023-12-18 DIAGNOSIS — I10 BENIGN ESSENTIAL HYPERTENSION: Primary | ICD-10-CM

## 2023-12-18 DIAGNOSIS — E11.42 TYPE 2 DIABETES MELLITUS WITH DIABETIC POLYNEUROPATHY, WITHOUT LONG-TERM CURRENT USE OF INSULIN (MULTI): ICD-10-CM

## 2023-12-18 PROCEDURE — 1036F TOBACCO NON-USER: CPT | Performed by: INTERNAL MEDICINE

## 2023-12-18 PROCEDURE — 1126F AMNT PAIN NOTED NONE PRSNT: CPT | Performed by: INTERNAL MEDICINE

## 2023-12-18 PROCEDURE — 1160F RVW MEDS BY RX/DR IN RCRD: CPT | Performed by: INTERNAL MEDICINE

## 2023-12-18 PROCEDURE — 3078F DIAST BP <80 MM HG: CPT | Performed by: INTERNAL MEDICINE

## 2023-12-18 PROCEDURE — 3074F SYST BP LT 130 MM HG: CPT | Performed by: INTERNAL MEDICINE

## 2023-12-18 PROCEDURE — 1159F MED LIST DOCD IN RCRD: CPT | Performed by: INTERNAL MEDICINE

## 2023-12-18 PROCEDURE — 99214 OFFICE O/P EST MOD 30 MIN: CPT | Performed by: INTERNAL MEDICINE

## 2023-12-18 RX ORDER — GABAPENTIN 100 MG/1
100 CAPSULE ORAL NIGHTLY
Qty: 90 CAPSULE | Refills: 2 | Status: SHIPPED | OUTPATIENT
Start: 2023-12-18 | End: 2024-05-21

## 2023-12-18 ASSESSMENT — PAIN SCALES - GENERAL: PAINLEVEL: 0-NO PAIN

## 2023-12-18 NOTE — ASSESSMENT & PLAN NOTE
>>ASSESSMENT AND PLAN FOR ELEVATED PSA WRITTEN ON 12/18/2023  2:38 PM BY MISHEL WU MD    Urology consultation to plan further evaluation and treatment.

## 2023-12-18 NOTE — PROGRESS NOTES
Subjective   Patient ID: Boni Gongora is a 78 y.o. male who presents for Follow-up.  Has benefited greatly from increased gabapentin @ HS. He is now taking 300mg at dinner and 400mg at HS and has had substantial relief.  A1C=6.0%    No complaints.      Current Outpatient Medications   Medication Instructions    cetirizine (ZyrTEC) 10 mg tablet Take 1 tablet by mouth every day  Indications: allergy prevention    cholecalciferol (Vitamin D-3) 25 MCG (1000 UT) capsule Take as directed twice per week  Indications: bariatric procedure    cholecalciferol (VITAMIN D-3) 25 mcg, oral, 2 times weekly    cyanocobalamin, vitamin B-12, 1,000 mcg tablet, sublingual 1 tablet, sublingual, 2 times weekly    diazePAM (Valium) 5 mg tablet As needed    diphenhydrAMINE (BENADryl) 25 mg capsule No dose, route, or frequency recorded.    empagliflozin (JARDIANCE) 25 mg, oral, Daily    ferrous sulfate 325 (65 Fe) MG tablet Take as directed twice per week  Indications: bariatric procedure    ferrous sulfate 65 mg, oral, 2 times weekly    flash glucose sensor (FREESTYLE MELVIN 2 SENSOR [facility]) Apply 1 sensor every 14 days to the back of upper arm    FLUoxetine (PROZAC) 20 mg, oral, Every other day    FreeStyle Melvin 2 Sensor kit APPLY 1 SENSOR EVERY 14 DAYS TO THE BACK OF UPPER ARM.    gabapentin (Neurontin) 300 mg capsule TAKE 1 CAPSULE BY MOUTH TWICE A DAY    gabapentin (NEURONTIN) 100 mg, oral, Nightly, HS dose = 400mg.    metFORMIN (Glucophage) 1,000 mg tablet 1 tablet, oral, 2 times daily with meals    MULTIVITAMIN ORAL 1 tablet, oral, Daily    omeprazole (PRILOSEC) 20 mg, oral, Daily before breakfast    Ozempic 1 mg, subcutaneous, Weekly    ramipril (Altace) 5 mg capsule 1 capsule, oral, Daily    sildenafil (Viagra) 50 mg tablet 1-2 tablets, oral, 30 minutes before sexual activity    simvastatin (Zocor) 20 mg tablet 1 tablet, oral, Every evening    VITAMIN B COMPLEX ORAL Take as directed twice per week  Indications: bariatric  procecdure     Review of Systems  All other systems are reviewed and are without complaint.    Objective   /69 (BP Location: Left arm, Patient Position: Sitting, BP Cuff Size: Adult)   Pulse 84   Temp 36.4 °C (97.6 °F) (Oral)   Wt 74.4 kg (164 lb)   BMI 24.22 kg/m²   Physical Exam  Constitutional: Alert and in no acute distress  Inspection of Eyes: Sclera and conjunctivae normal.  Pupil exam: PERRL. EOMI.  Neck: Thyroid normal. No cervical lymphadenopathy.  Lungs are clear to auscultation and percussion.  Cardiac: S1 and S2 are normal. No murmurs, rubs, or gallops. No edema.   Musculoskeletal: Examination of gait is normal. No clubbing or cyanosis.   Skin normal skin color and pigmentation. No visible rash. Nml skin turgor.  Neurological: Cranial nerves II-XII intact. No focal motor weakness. Coordination normal. Balance normal.  Psychiatric: A&Ox3. Mood and affect normal.      Assessment/Plan   Problem List Items Addressed This Visit             ICD-10-CM    Benign essential hypertension - Primary I10     Well controlled. Continue Current Management         Diabetes mellitus (CMS/HCC) E11.9     Well controlled. Continue Current Management         Relevant Medications    gabapentin (Neurontin) 100 mg capsule    Elevated PSA R97.20     Urology consultation to plan further evaluation and treatment.        F/U 6 months for PSE.

## 2023-12-21 ENCOUNTER — OFFICE VISIT (OUTPATIENT)
Dept: UROLOGY | Facility: CLINIC | Age: 78
End: 2023-12-21
Payer: MEDICARE

## 2023-12-21 VITALS — TEMPERATURE: 96.8 F | HEIGHT: 69 IN | WEIGHT: 164 LBS | BODY MASS INDEX: 24.29 KG/M2

## 2023-12-21 DIAGNOSIS — R97.20 ELEVATED PSA: Primary | ICD-10-CM

## 2023-12-21 PROCEDURE — 1159F MED LIST DOCD IN RCRD: CPT | Performed by: STUDENT IN AN ORGANIZED HEALTH CARE EDUCATION/TRAINING PROGRAM

## 2023-12-21 PROCEDURE — 1036F TOBACCO NON-USER: CPT | Performed by: STUDENT IN AN ORGANIZED HEALTH CARE EDUCATION/TRAINING PROGRAM

## 2023-12-21 PROCEDURE — 1126F AMNT PAIN NOTED NONE PRSNT: CPT | Performed by: STUDENT IN AN ORGANIZED HEALTH CARE EDUCATION/TRAINING PROGRAM

## 2023-12-21 PROCEDURE — 99203 OFFICE O/P NEW LOW 30 MIN: CPT | Performed by: STUDENT IN AN ORGANIZED HEALTH CARE EDUCATION/TRAINING PROGRAM

## 2023-12-21 PROCEDURE — 1160F RVW MEDS BY RX/DR IN RCRD: CPT | Performed by: STUDENT IN AN ORGANIZED HEALTH CARE EDUCATION/TRAINING PROGRAM

## 2023-12-21 NOTE — PROGRESS NOTES
HPI:    Boni Gongora is a 78 y.o. male referred by Dr. Morataya for elevated PSA. Hx of ED, kidney stones, HTN, DM2,GERD, HEMANT. PSA 7.85 (9/5/23). MRI Prostate (11/7/23) showed 34g, PI-RADS 4 lesion within the left posteromedial PZ at the basilar gland, lesion abuts the capsule and the medial aspect of the left seminal vesicle raising concern for extracapsular extension, few nonenlarged mesorectal lymph nodes which are indeterminate, changes associated with BPH including nodules (PI-RADS 2). Good urinary function. Endorses ED. Doing well.     PSA: 7.85 (9/5/23)    MRI Prostate (11/7/23): 34g, PI-RADS 4 lesion within the left posteromedial PZ at the basilar gland, lesion abuts the capsule and the medial aspect of the left seminal vesicle raising concern for extracapsular extension, few nonenlarged mesorectal lymph nodes which are indeterminate, changes associated with BPH including nodules (PI-RADS 2).    Review of Systems:  All systems reviewed. Anything negative noted in the HPI.    Physical Exam:  Vitals signs reviewed.  Constitutional:      Appearance: Well-developed.  HENT:     Head: Normocephalic and atraumatic.  Neck:     Musculoskeletal: Normal range of motion.  Pulmonary:     Effort: Pulmonary effort is normal.  Musculoskeletal: Normal range of motion.  Skin:     General: Skin is warm and dry.  Neurological:     Mental Status: Alert and oriented to person, place, and time.  Psychiatric:        Behavior: Behavior normal.        Thought Content: Thought content normal.        Judgment: Judgment normal.    Assessment/Plan   Boni Gongora is a 78 y.o. male referred by Dr. Morataya for elevated PSA. Hx of ED, kidney stones, HTN, DM2,GERD, HEMANT. PSA 7.85 (9/5/23). MRI Prostate (11/7/23) showed 34g, PI-RADS 4 lesion within the left posteromedial PZ at the basilar gland, lesion abuts the capsule and the medial aspect of the left seminal vesicle raising concern for extracapsular extension, few nonenlarged  mesorectal lymph nodes which are indeterminate, changes associated with BPH including nodules (PI-RADS 2). Good urinary function. Endorses ED. Doing well. Management options including risks, benefits and alternatives discussed at length and all questions answered. Patient prefers to proceed with TP prostate biopsy at [facility].             By signing my name below, I, Lupe Cook, attest that this documentation has been prepared under the direction and in the presence of Dr. Jose David Gordon.  All medical record entries made by the Alexandraibjaya were at my direction and personally dictated by me. I have reviewed the chart and agree that the record accurately reflects my personal performance of the history, physical exam, discussion and plan.

## 2023-12-21 NOTE — LETTER
December 22, 2023     Darrick Morataya MD  [address] [facility] [id number]    Patient: Boni Gongora   YOB: 1945   Date of Visit: 12/21/2023       Dear Dr. Darrick Morataya MD:    Thank you for referring Boni Gongora to me for evaluation. Below are my notes for this consultation.  If you have questions, please do not hesitate to call me. I look forward to following your patient along with you.       Sincerely,     Jose David Gordon MD      CC: No Recipients  ______________________________________________________________________________________    HPI:    Boni Gongora is a 78 y.o. male referred by Dr. Morataya for elevated PSA. Hx of ED, kidney stones, HTN, DM2,GERD, HEMANT. PSA 7.85 (9/5/23). MRI Prostate (11/7/23) showed 34g, PI-RADS 4 lesion within the left posteromedial PZ at the basilar gland, lesion abuts the capsule and the medial aspect of the left seminal vesicle raising concern for extracapsular extension, few nonenlarged mesorectal lymph nodes which are indeterminate, changes associated with BPH including nodules (PI-RADS 2). Good urinary function. Endorses ED. Doing well.     PSA: 7.85 (9/5/23)    MRI Prostate (11/7/23): 34g, PI-RADS 4 lesion within the left posteromedial PZ at the basilar gland, lesion abuts the capsule and the medial aspect of the left seminal vesicle raising concern for extracapsular extension, few nonenlarged mesorectal lymph nodes which are indeterminate, changes associated with BPH including nodules (PI-RADS 2).    Review of Systems:  All systems reviewed. Anything negative noted in the HPI.    Physical Exam:  Vitals signs reviewed.  Constitutional:      Appearance: Well-developed.  HENT:     Head: Normocephalic and atraumatic.  Neck:     Musculoskeletal: Normal range of motion.  Pulmonary:     Effort: Pulmonary effort is normal.  Musculoskeletal: Normal range of motion.  Skin:     General: Skin is warm and dry.  Neurological:     Mental Status: Alert  and oriented to person, place, and time.  Psychiatric:        Behavior: Behavior normal.        Thought Content: Thought content normal.        Judgment: Judgment normal.    Assessment/Plan  Boni Gongora is a 78 y.o. male referred by Dr. Morataya for elevated PSA. Hx of ED, kidney stones, HTN, DM2,GERD, HEMANT. PSA 7.85 (9/5/23). MRI Prostate (11/7/23) showed 34g, PI-RADS 4 lesion within the left posteromedial PZ at the basilar gland, lesion abuts the capsule and the medial aspect of the left seminal vesicle raising concern for extracapsular extension, few nonenlarged mesorectal lymph nodes which are indeterminate, changes associated with BPH including nodules (PI-RADS 2). Good urinary function. Endorses ED. Doing well. Management options including risks, benefits and alternatives discussed at length and all questions answered. Patient prefers to proceed with TP prostate biopsy at [facility].             By signing my name below, I, Lupe Cook, attest that this documentation has been prepared under the direction and in the presence of Dr. Jose David Gordon.  All medical record entries made by the Scribe were at my direction and personally dictated by me. I have reviewed the chart and agree that the record accurately reflects my personal performance of the history, physical exam, discussion and plan.

## 2023-12-22 ENCOUNTER — PREP FOR PROCEDURE (OUTPATIENT)
Dept: ANESTHESIOLOGY | Facility: HOSPITAL | Age: 78
End: 2023-12-22
Payer: MEDICARE

## 2023-12-22 DIAGNOSIS — R97.20 ELEVATED PSA: Primary | ICD-10-CM

## 2023-12-22 RX ORDER — SODIUM CHLORIDE, SODIUM LACTATE, POTASSIUM CHLORIDE, CALCIUM CHLORIDE 600; 310; 30; 20 MG/100ML; MG/100ML; MG/100ML; MG/100ML
100 INJECTION, SOLUTION INTRAVENOUS CONTINUOUS
Status: CANCELLED | OUTPATIENT
Start: 2023-12-22

## 2023-12-22 RX ORDER — CEFTRIAXONE 2 G/50ML
2 INJECTION, SOLUTION INTRAVENOUS ONCE
Status: CANCELLED | OUTPATIENT
Start: 2023-12-22 | End: 2023-12-22

## 2023-12-29 DIAGNOSIS — N40.0 BENIGN PROSTATIC HYPERPLASIA, UNSPECIFIED WHETHER LOWER URINARY TRACT SYMPTOMS PRESENT: ICD-10-CM

## 2023-12-29 DIAGNOSIS — N33 BLADDER DISORDERS IN DISEASES CLASSIFIED ELSEWHERE: ICD-10-CM

## 2023-12-29 DIAGNOSIS — Z01.818 PREOP TESTING: ICD-10-CM

## 2023-12-29 DIAGNOSIS — R97.20 ELEVATED PSA: ICD-10-CM

## 2024-01-18 DIAGNOSIS — E11.3299 DIABETIC RETINOPATHY, BACKGROUND (MULTI): ICD-10-CM

## 2024-01-18 RX ORDER — EMPAGLIFLOZIN 25 MG/1
25 TABLET, FILM COATED ORAL DAILY
Qty: 90 TABLET | Refills: 3 | Status: SHIPPED | OUTPATIENT
Start: 2024-01-18

## 2024-01-25 ENCOUNTER — APPOINTMENT (OUTPATIENT)
Dept: PREADMISSION TESTING | Facility: HOSPITAL | Age: 79
End: 2024-01-25
Payer: MEDICARE

## 2024-01-26 ENCOUNTER — PRE-ADMISSION TESTING (OUTPATIENT)
Dept: PREADMISSION TESTING | Facility: HOSPITAL | Age: 79
End: 2024-01-26
Payer: MEDICARE

## 2024-01-26 ENCOUNTER — APPOINTMENT (OUTPATIENT)
Dept: PREADMISSION TESTING | Facility: HOSPITAL | Age: 79
End: 2024-01-26
Payer: MEDICARE

## 2024-01-26 VITALS
HEIGHT: 69 IN | HEART RATE: 78 BPM | WEIGHT: 164.9 LBS | TEMPERATURE: 96.9 F | OXYGEN SATURATION: 100 % | SYSTOLIC BLOOD PRESSURE: 129 MMHG | RESPIRATION RATE: 16 BRPM | DIASTOLIC BLOOD PRESSURE: 65 MMHG | BODY MASS INDEX: 24.42 KG/M2

## 2024-01-26 DIAGNOSIS — N40.0 BENIGN PROSTATIC HYPERPLASIA, UNSPECIFIED WHETHER LOWER URINARY TRACT SYMPTOMS PRESENT: ICD-10-CM

## 2024-01-26 DIAGNOSIS — N33 BLADDER DISORDERS IN DISEASES CLASSIFIED ELSEWHERE: ICD-10-CM

## 2024-01-26 DIAGNOSIS — Z01.818 PREOP TESTING: ICD-10-CM

## 2024-01-26 DIAGNOSIS — R97.20 ELEVATED PSA: ICD-10-CM

## 2024-01-26 LAB
ANION GAP SERPL CALC-SCNC: 13 MMOL/L (ref 10–20)
ATRIAL RATE: 80 BPM
BUN SERPL-MCNC: 15 MG/DL (ref 6–23)
CALCIUM SERPL-MCNC: 9.8 MG/DL (ref 8.6–10.3)
CHLORIDE SERPL-SCNC: 103 MMOL/L (ref 98–107)
CO2 SERPL-SCNC: 30 MMOL/L (ref 21–32)
CREAT SERPL-MCNC: 0.85 MG/DL (ref 0.5–1.3)
EGFRCR SERPLBLD CKD-EPI 2021: 89 ML/MIN/1.73M*2
ERYTHROCYTE [DISTWIDTH] IN BLOOD BY AUTOMATED COUNT: 12.8 % (ref 11.5–14.5)
GLUCOSE SERPL-MCNC: 127 MG/DL (ref 74–99)
HCT VFR BLD AUTO: 45.9 % (ref 41–52)
HGB BLD-MCNC: 14.2 G/DL (ref 13.5–17.5)
INR PPP: 1 (ref 0.9–1.2)
MCH RBC QN AUTO: 28.9 PG (ref 26–34)
MCHC RBC AUTO-ENTMCNC: 30.9 G/DL (ref 32–36)
MCV RBC AUTO: 94 FL (ref 80–100)
NRBC BLD-RTO: ABNORMAL /100{WBCS}
P AXIS: 53 DEGREES
P OFFSET: 190 MS
P ONSET: 133 MS
PLATELET # BLD AUTO: 131 X10*3/UL (ref 150–450)
POTASSIUM SERPL-SCNC: 4.7 MMOL/L (ref 3.5–5.3)
PR INTERVAL: 164 MS
PROTHROMBIN TIME: 9.8 SECONDS (ref 9.3–12.7)
Q ONSET: 215 MS
QRS COUNT: 13 BEATS
QRS DURATION: 88 MS
QT INTERVAL: 384 MS
QTC CALCULATION(BAZETT): 442 MS
QTC FREDERICIA: 423 MS
R AXIS: -54 DEGREES
RBC # BLD AUTO: 4.91 X10*6/UL (ref 4.5–5.9)
SODIUM SERPL-SCNC: 141 MMOL/L (ref 136–145)
T AXIS: 62 DEGREES
T OFFSET: 407 MS
VENTRICULAR RATE: 80 BPM
WBC # BLD AUTO: 7.1 X10*3/UL (ref 4.4–11.3)

## 2024-01-26 PROCEDURE — 85027 COMPLETE CBC AUTOMATED: CPT

## 2024-01-26 PROCEDURE — 85610 PROTHROMBIN TIME: CPT

## 2024-01-26 PROCEDURE — 99203 OFFICE O/P NEW LOW 30 MIN: CPT | Performed by: NURSE PRACTITIONER

## 2024-01-26 PROCEDURE — 87086 URINE CULTURE/COLONY COUNT: CPT

## 2024-01-26 PROCEDURE — 93010 ELECTROCARDIOGRAM REPORT: CPT | Performed by: INTERNAL MEDICINE

## 2024-01-26 PROCEDURE — 93005 ELECTROCARDIOGRAM TRACING: CPT

## 2024-01-26 PROCEDURE — 36415 COLL VENOUS BLD VENIPUNCTURE: CPT

## 2024-01-26 PROCEDURE — 80048 BASIC METABOLIC PNL TOTAL CA: CPT

## 2024-01-26 ASSESSMENT — PAIN SCALES - GENERAL: PAINLEVEL_OUTOF10: 0 - NO PAIN

## 2024-01-26 ASSESSMENT — PAIN - FUNCTIONAL ASSESSMENT: PAIN_FUNCTIONAL_ASSESSMENT: 0-10

## 2024-01-26 NOTE — PREPROCEDURE INSTRUCTIONS
Medication List            Accurate as of January 26, 2024 10:42 AM. Always use your most recent med list.                * cholecalciferol 25 MCG (1000 UT) tablet  Commonly known as: Vitamin D-3  Medication Adjustments for Surgery: Stop 7 days before surgery     * cholecalciferol 25 MCG (1000 UT) capsule  Commonly known as: Vitamin D-3  Medication Adjustments for Surgery: Stop 7 days before surgery     cyanocobalamin (vitamin B-12) 1,000 mcg tablet, sublingual  Medication Adjustments for Surgery: Stop 7 days before surgery     diphenhydrAMINE 25 mg capsule  Commonly known as: BENADryl  Medication Adjustments for Surgery: Continue until night before surgery     * ferrous sulfate 325 (65 Fe) MG EC tablet  Medication Adjustments for Surgery: Stop 7 days before surgery     * ferrous sulfate (325 mg ferrous sulfate) tablet  Medication Adjustments for Surgery: Stop 7 days before surgery     FLUoxetine 20 mg capsule  Commonly known as: PROzac  TAKE 1 CAPSULE BY MOUTH EVERY OTHER DAY  Medication Adjustments for Surgery: Take morning of surgery with sip of water, no other fluids     * FREESTYLE MELVIN 2 SENSOR MISC     * FreeStyle Melvin 2 Sensor kit  Generic drug: FreeStyle Melvin sensor system     * gabapentin 300 mg capsule  Commonly known as: Neurontin  TAKE 1 CAPSULE BY MOUTH TWICE A DAY  Medication Adjustments for Surgery: Continue until night before surgery     * gabapentin 100 mg capsule  Commonly known as: Neurontin  Take 1 capsule (100 mg) by mouth once daily at bedtime. HS dose = 400mg.     Jardiance 25 mg  Generic drug: empagliflozin  TAKE 1 TABLET ONCE DAILY     metFORMIN 1,000 mg tablet  Commonly known as: Glucophage  Medication Adjustments for Surgery: Stop 3 days before surgery     MULTIVITAMIN ORAL  Medication Adjustments for Surgery: Stop 7 days before surgery     omeprazole 20 mg DR capsule  Commonly known as: PriLOSEC  Medication Adjustments for Surgery: Take morning of surgery with sip of water, no  other fluids     Ozempic 1 mg/dose (4 mg/3 mL) pen injector  Generic drug: semaglutide  INJECT 1MG SUBCUTANEOUSLY  ONCE A WEEK  Medication Adjustments for Surgery: Stop 7 days before surgery     ramipril 5 mg capsule  Commonly known as: Altace  Medication Adjustments for Surgery: Stop 1 day before surgery     sildenafil 50 mg tablet  Commonly known as: Viagra  Medication Adjustments for Surgery: Stop 3 days before surgery     simvastatin 20 mg tablet  Commonly known as: Zocor  Medication Adjustments for Surgery: Continue until night before surgery     VITAMIN B COMPLEX ORAL  Medication Adjustments for Surgery: Stop 7 days before surgery           * This list has 8 medication(s) that are the same as other medications prescribed for you. Read the directions carefully, and ask your doctor or other care provider to review them with you.                                  NPO Instructions:    Do not eat any food after midnight the night before your surgery/procedure.    Additional Instructions:     Day of Surgery:  Review your medication instructions, take indicated medications  If you have diabetes, please check your fasting blood sugar upon awakening.  If fasting blood sugar is <80 mg/dl, drink 100 ml of apple juice, time limit of 2 hours before  Wear  comfortable loose fitting clothing  Do not use moisturizers, creams, lotions or perfume  All jewelry and valuables should be left at home  PAT DISCHARGE INSTRUCTIONS    Please call the Same Day Surgery (SDS) Department of the hospital where your procedure will be performed after 2:00 PM the day before your surgery. If you are scheduled on a Monday, or a Tuesday following a Monday holiday, you will need to call on the last business day prior to your surgery.      [facility]  [address] [place] [address]  [phone]    Please let your surgeon know if:      You develop any open sores, shingles, burning or painful urination as  these may increase your risk of an infection.   You no longer wish to have the surgery.   Any other personal circumstances change that may lead to the need to cancel or defer this surgery-such as being sick or getting admitted to any hospital within one week of your planned procedure.    Your contact details change, such as a change of address or phone number.    Starting now:     Please DO NOT drink alcohol or smoke for 24 hours before surgery. It is well known that quitting smoking can make a huge difference to your health and recovery from surgery. The longer you abstain from smoking, the better your chances of a healthy recovery. If you need help with quitting, call 7-916-QUIT-NOW to be connected to a trained counselor who will discuss the best methods to help you quit.     Before your surgery:    Please stop all supplements 7 days prior to surgery. Or as directed by your surgeon.   Please stop taking NSAID pain medicine such as Advil and Motrin 7 days before surgery.    If you develop any fever, cough, cold, rashes, cuts, scratches, scrapes, urinary symptoms or infection anywhere on your body (including teeth and gums) prior to surgery, please call your surgeon’s office as soon as possible. This may require treatment to reduce the chance of cancellation on the day of surgery.    The day before your surgery:   Get a good night’s rest.  Use the special soap for bathing if you have been instructed to use one.    Scheduled surgery times may change and you will be notified if this occurs - please check your personal voicemail for any updates.     On the morning of surgery:   Wear comfortable, loose fitting clothes which open in the front. Please do not wear moisturizers, creams, lotions, makeup or perfume.    Please bring with you to surgery:   Photo ID and insurance card   Current list of medicines and allergies   Pacemaker/ Defibrillator/Heart stent cards   CPAP machine and mask    Slings/ splints/ crutches   A  copy of your complete advanced directive/DHPOA.    Please do NOT bring with you to surgery:   All jewelry and valuables should be left at home.   Prosthetic devices such as contact lenses, hearing aids, dentures, eyelash extensions, hairpins and body piercings must be removed prior to going in to the surgical suite.    After outpatient surgery:   A responsible adult MUST accompany you at the time of discharge and stay with you for 24 hours after your surgery. You may NOT drive yourself home after surgery.    Do not drive, operate machinery, make critical decisions or do activities that require co-ordination or balance until after a night’s sleep.   Do not drink alcoholic beverages for 24 hours.   Instructions for resuming your medications will be provided by your surgeon.    CALL YOUR DOCTOR AFTER SURGERY IF YOU HAVE:     Chills and/or a fever of 101° F or higher.    Redness, swelling, pus or drainage from your surgical wound or a bad smell from the wound.    Lightheadedness, fainting or confusion.    Persistent vomiting (throwing up) and are not able to eat or drink for 12 hours.    Three or more loose, watery bowel movements in 24 hours (diarrhea).   Difficulty or pain while urinating( after non-urological surgery)    Pain and swelling in your legs, especially if it is only on one side.    Difficulty breathing or are breathing faster than normal.    Any new concerning symptoms.

## 2024-01-26 NOTE — CPM/PAT H&P
CPM/PAT Evaluation       Name: Boni Gongora (Boni Gongora)  /Age: 1945/78 y.o.     In-Person       Chief Complaint: elevated PSA    Patient is a 77 y/o alert and oriented male coming in for PAT for a Fusion prostate biopsy scheduled on 24. Patient denies Cx pain, SOB, WEBB, and NVDC. Patient also denies Hx DVT/PE. Current medications were reviewed and a presurgical medication schedule was provided. He has no questions at this time.     Past Medical History:   Diagnosis Date    BPH (benign prostatic hyperplasia)     Diabetes mellitus (CMS/[facility])     Diplopia 2016    Binocular vision disorder with diplopia    Diplopia 05/10/2016    Binocular vision disorder with diplopia    Diplopia 05/10/2016    Binocular vision disorder with diplopia    Diplopia 05/10/2016    Binocular vision disorder with diplopia    Diplopia 05/10/2016    Binocular vision disorder with diplopia    Hesitancy of micturition 2015    Hesitancy of micturition    Hyperlipidemia     Hypertension     Personal history of other endocrine, nutritional and metabolic disease     History of type 2 diabetes mellitus    Personal history of other mental and behavioral disorders 2014    History of anxiety disorder    Urinary tract infection, site not specified 10/28/2019    Acute UTI (urinary tract infection)    Vitamin D deficiency, unspecified 2014    Vitamin D deficiency       Past Surgical History:   Procedure Laterality Date    ADENOIDECTOMY      BARIATRIC SURGERY      KIDNEY STONE SURGERY      KNEE SURGERY  2014    Knee Surgery    OTHER SURGICAL HISTORY  2021    Cataract surgery    OTHER SURGICAL HISTORY  2022    Gastric Surgery For Morbid Obesity Laparoscopic Longitudinal Gastrectomy       Patient  has no history on file for sexual activity.    Family History   Problem Relation Name Age of Onset    Lymphoma Mother          non-hodgkins    Diabetes Father         Allergies   Allergen Reactions     Morphine Hallucinations    Pollen Extracts Unknown       Medication Documentation Review Audit       Reviewed by Sabra Martinez RN (Registered Nurse) on 01/26/24 at 1033      Medication Order Taking? Sig Documenting Provider Last Dose Status     Discontinued 01/26/24 1029   cholecalciferol (Vitamin D-3) 25 MCG (1000 UT) capsule [id number]  Take as directed twice per week  Indications: bariatric procedure Jackson Diez MD  Active   cholecalciferol (Vitamin D-3) 25 MCG (1000 UT) tablet [id number]  Take 1 tablet (25 mcg) by mouth 2 times a week. Jackson Diez MD  Active   cyanocobalamin, vitamin B-12, 1,000 mcg tablet, sublingual [id number]  Place 1 tablet (1,000 mcg) under the tongue 2 times a week. Jackson Diez MD  Active     Discontinued 01/26/24 1030   diphenhydrAMINE (BENADryl) 25 mg capsule [id number]   Jackson Diez MD  Active   ferrous sulfate 325 (65 Fe) MG EC tablet [id number]  Take 1 tablet (65 mg) by mouth 2 times a week. Jackson Diez MD  Active   ferrous sulfate 325 (65 Fe) MG tablet [id number]  Take as directed twice per week  Indications: bariatric procedure Jackson Diez MD  Active   flash glucose sensor ([facility]ENSOR [facility]) [id number]  Apply 1 sensor every 14 days to the back of upper arm Jackson Diez MD  Active   FLUoxetine (PROzac) 20 mg capsule [id number]  TAKE 1 CAPSULE BY MOUTH EVERY OTHER DAY Darrick Morataya MD  Active   FreeStyle Agustin 2 Sensor kit [id number]  APPLY 1 SENSOR EVERY 14 DAYS TO THE BACK OF UPPER ARM. Jackson Diez MD  Active   gabapentin (Neurontin) 100 mg capsule [id number]  Take 1 capsule (100 mg) by mouth once daily at bedtime. HS dose = 400mg. Darrick Morataya MD  Active   gabapentin (Neurontin) 300 mg capsule [id number]  TAKE 1 CAPSULE BY MOUTH TWICE A DAY Darrick Morataya MD  Active   Jardiance 25 mg [id number]  TAKE 1 TABLET ONCE DAILY Darrick Morataya MD  Active   metFORMIN (Glucophage) 1,000 mg tablet [id number]  Take 1  "tablet (1,000 mg) by mouth 2 times a day with meals. Historical Provider, MD  Active   MULTIVITAMIN ORAL [id number]  Take 1 tablet by mouth 1 (one) time each day. Historical Provider, MD  Active   omeprazole (PriLOSEC) 20 mg DR capsule [id number]  Take 1 capsule (20 mg) by mouth once daily in the morning. Take before meals. Historical Provider, MD  Active   Ozempic 1 mg/dose (4 mg/3 mL) pen injector [id number]  INJECT 1MG SUBCUTANEOUSLY  ONCE A WEEK   Patient taking differently: Inject 1 mg under the skin 1 (one) time per week. SUNDAY    Darrick Morataya MD  Active   ramipril (Altace) 5 mg capsule [id number]  Take 1 capsule (5 mg) by mouth once daily. Historical Provider, MD  Active   sildenafil (Viagra) 50 mg tablet [id number]  Take 1-2 tablets ( mg) by mouth. 30 minutes before sexual activity Historical Provider, MD  Active   simvastatin (Zocor) 20 mg tablet [id number]  Take 1 tablet (20 mg) by mouth once daily in the evening. Historical Provider, MD  Active   VITAMIN B COMPLEX ORAL [id number]  Take as directed twice per week  Indications: bariatric procecdure Historical Provider, MD  Active                   Visit Vitals  /65   Pulse 78   Temp 36.1 °C (96.9 °F)   Resp 16   Ht 1.753 m (5' 9\")   Wt 74.8 kg (164 lb 14.5 oz)   SpO2 100%   BMI 24.35 kg/m²   Smoking Status Former   BSA 1.91 m²        Review of Systems   Constitutional: Negative for chills, decreased appetite, diaphoresis, fever and malaise/fatigue.   Eyes:  Negative for blurred vision and double vision.   Cardiovascular:  Negative for chest pain, claudication, cyanosis, dyspnea on exertion, irregular heartbeat, leg swelling, near-syncope and palpitations.   Respiratory:  Negative for cough, hemoptysis, shortness of breath and wheezing.    Endocrine: Negative for cold intolerance, heat intolerance, polydipsia, polyphagia and polyuria.   Gastrointestinal:  Negative for abdominal pain, constipation, diarrhea, dysphagia, nausea and vomiting.   Genitourinary: "  Negative for bladder incontinence, dysuria, hematuria, incomplete emptying, nocturia, pelvic pain and urgency.   Neurological:  Negative for headaches, light-headedness, paresthesias, sensory change and weakness.   Psychiatric/Behavioral:  Negative for altered mental status.       Vitals and nursing note reviewed. Physical exam within normal limits.   Constitutional:       Appearance: Normal appearance. He is normal weight.   HENT:      Head: Normocephalic and atraumatic.      Mouth/Throat:      Mouth: Mucous membranes are moist.      Pharynx: Oropharynx is clear.   Eyes:      Extraocular Movements: Extraocular movements intact.      Conjunctiva/sclera: Conjunctivae normal.      Pupils: Pupils are equal, round, and reactive to light.   Cardiovascular:      Rate and Rhythm: Normal rate and regular rhythm.      Pulses: Normal pulses.      Heart sounds: Normal heart sounds.      No audible carotid bruit  Pulmonary:      Effort: Pulmonary effort is normal.      Breath sounds: Normal breath sounds.   Abdominal:      General: Abdomen is flat. Bowel sounds are normal.      Palpations: Abdomen is soft.   Musculoskeletal:      Cervical back: Normal range of motion and neck supple.   Skin:     General: Skin is warm and dry.      Capillary Refill: Capillary refill takes less than 2 seconds.   Neurological:      General: No focal deficit present.      Mental Status: He is alert and oriented to person, place, and time. Mental status is at baseline.   Psychiatric:         Mood and Affect: Mood normal.         Behavior: Behavior normal.         Thought Content: Thought content normal.         Judgment: Judgment normal.     PAT AIRWAY:   Airway:     Mallampati::  II    TM distance::  >3 FB    Neck ROM::  Full      NO PERSONAL REPORTS OF REACTIONS TO ANESTHESIA  NO PERSONAL REPORTS OF FAMILY HISTORY OF REACTIONS TO ANESTHESIA  NO PERSONAL REPORTS OF METAL, NICKEL, OR SHELLFISH ALLERGY  Former smoker quit in 1968  NO ETOH/DRUGS      ASA II  RCRI-0 POINTS CLASS I RISK 3.9%  STOP-BANGS- 6 POINTS MODERATE RISK FOR HEMANT  NSQIP-PREDICTED LENGTH OF STAY  DAYS  ARISCAT-27 POINTS LOW RISK 13.3%  DASI-23.45 POINTS. 5.6 METS  REINALDO-0.1%  JHFRAT-2 POINTS HIGH RISK FOR FALLS      Assessment and Plan:   Elevated PSA  Fusion prostate biopsy scheduled on 2/6/24.   Hold NSAIDs  Hold herbal supplements  Abstain from ETOH  Labs ordered by urology  - urine culture, PT/INR, CBC, BMP  A1C 6.0 on 12/14/23  EKG showed - NSR @ 80, frequent PVCs  *CLEARED FOR SURGERY PENDING LABS/EKG   *FACE TO FACE TIME 15 MINUTES.       Type 2 DM  A1C 6.0 on 12/14/23  EKG ordered  Labs ordered as listed above  Continue current medications as directed      HEMANT  Continue use of cpap as directed  Labs and EKG as ordered above

## 2024-01-28 LAB — BACTERIA UR CULT: NO GROWTH

## 2024-01-31 DIAGNOSIS — E11.42 TYPE 2 DIABETES MELLITUS WITH DIABETIC POLYNEUROPATHY, WITHOUT LONG-TERM CURRENT USE OF INSULIN (MULTI): Primary | ICD-10-CM

## 2024-01-31 DIAGNOSIS — I10 BENIGN ESSENTIAL HYPERTENSION: ICD-10-CM

## 2024-01-31 RX ORDER — SIMVASTATIN 20 MG/1
20 TABLET, FILM COATED ORAL NIGHTLY
Qty: 90 TABLET | Refills: 3 | Status: SHIPPED | OUTPATIENT
Start: 2024-01-31

## 2024-01-31 RX ORDER — RAMIPRIL 5 MG/1
5 CAPSULE ORAL DAILY
Qty: 90 CAPSULE | Refills: 3 | Status: SHIPPED | OUTPATIENT
Start: 2024-01-31

## 2024-01-31 RX ORDER — METFORMIN HYDROCHLORIDE 1000 MG/1
1000 TABLET ORAL
Qty: 180 TABLET | Refills: 3 | Status: SHIPPED | OUTPATIENT
Start: 2024-01-31

## 2024-02-02 ENCOUNTER — ANESTHESIA EVENT (OUTPATIENT)
Dept: OPERATING ROOM | Facility: HOSPITAL | Age: 79
End: 2024-02-02
Payer: MEDICARE

## 2024-02-06 ENCOUNTER — ANESTHESIA (OUTPATIENT)
Dept: OPERATING ROOM | Facility: HOSPITAL | Age: 79
End: 2024-02-06
Payer: MEDICARE

## 2024-02-06 ENCOUNTER — HOSPITAL ENCOUNTER (OUTPATIENT)
Facility: HOSPITAL | Age: 79
Setting detail: OUTPATIENT SURGERY
Discharge: HOME | End: 2024-02-06
Attending: STUDENT IN AN ORGANIZED HEALTH CARE EDUCATION/TRAINING PROGRAM | Admitting: STUDENT IN AN ORGANIZED HEALTH CARE EDUCATION/TRAINING PROGRAM
Payer: MEDICARE

## 2024-02-06 VITALS
DIASTOLIC BLOOD PRESSURE: 87 MMHG | OXYGEN SATURATION: 98 % | RESPIRATION RATE: 16 BRPM | BODY MASS INDEX: 23.97 KG/M2 | HEIGHT: 69 IN | WEIGHT: 161.82 LBS | TEMPERATURE: 97.2 F | SYSTOLIC BLOOD PRESSURE: 150 MMHG | HEART RATE: 58 BPM

## 2024-02-06 DIAGNOSIS — R97.20 ELEVATED PSA: ICD-10-CM

## 2024-02-06 LAB — GLUCOSE BLD MANUAL STRIP-MCNC: 120 MG/DL (ref 74–99)

## 2024-02-06 PROCEDURE — 7100000001 HC RECOVERY ROOM TIME - INITIAL BASE CHARGE: Performed by: STUDENT IN AN ORGANIZED HEALTH CARE EDUCATION/TRAINING PROGRAM

## 2024-02-06 PROCEDURE — 99100 ANES PT EXTEME AGE<1 YR&>70: CPT | Performed by: ANESTHESIOLOGY

## 2024-02-06 PROCEDURE — C1819 TISSUE LOCALIZATION-EXCISION: HCPCS | Performed by: STUDENT IN AN ORGANIZED HEALTH CARE EDUCATION/TRAINING PROGRAM

## 2024-02-06 PROCEDURE — 76872 US TRANSRECTAL: CPT | Performed by: STUDENT IN AN ORGANIZED HEALTH CARE EDUCATION/TRAINING PROGRAM

## 2024-02-06 PROCEDURE — 2720000007 HC OR 272 NO HCPCS: Performed by: STUDENT IN AN ORGANIZED HEALTH CARE EDUCATION/TRAINING PROGRAM

## 2024-02-06 PROCEDURE — 7100000010 HC PHASE TWO TIME - EACH INCREMENTAL 1 MINUTE: Performed by: STUDENT IN AN ORGANIZED HEALTH CARE EDUCATION/TRAINING PROGRAM

## 2024-02-06 PROCEDURE — 7100000009 HC PHASE TWO TIME - INITIAL BASE CHARGE: Performed by: STUDENT IN AN ORGANIZED HEALTH CARE EDUCATION/TRAINING PROGRAM

## 2024-02-06 PROCEDURE — A55700 PR BIOPSY OF PROSTATE,NEEDLE/PUNCH: Performed by: ANESTHESIOLOGIST ASSISTANT

## 2024-02-06 PROCEDURE — 55700 PR PROSTATE NEEDLE BIOPSY ANY APPROACH: CPT | Performed by: STUDENT IN AN ORGANIZED HEALTH CARE EDUCATION/TRAINING PROGRAM

## 2024-02-06 PROCEDURE — G0416 PROSTATE BIOPSY, ANY MTHD: HCPCS | Mod: TC,BEALAB,WESLAB | Performed by: STUDENT IN AN ORGANIZED HEALTH CARE EDUCATION/TRAINING PROGRAM

## 2024-02-06 PROCEDURE — 3600000007 HC OR TIME - EACH INCREMENTAL 1 MINUTE - PROCEDURE LEVEL TWO: Performed by: STUDENT IN AN ORGANIZED HEALTH CARE EDUCATION/TRAINING PROGRAM

## 2024-02-06 PROCEDURE — 3600000002 HC OR TIME - INITIAL BASE CHARGE - PROCEDURE LEVEL TWO: Performed by: STUDENT IN AN ORGANIZED HEALTH CARE EDUCATION/TRAINING PROGRAM

## 2024-02-06 PROCEDURE — 88344 IMHCHEM/IMCYTCHM EA MLT ANTB: CPT | Mod: TC,SUR,BEALAB,WESLAB | Performed by: STUDENT IN AN ORGANIZED HEALTH CARE EDUCATION/TRAINING PROGRAM

## 2024-02-06 PROCEDURE — 2500000005 HC RX 250 GENERAL PHARMACY W/O HCPCS: Performed by: STUDENT IN AN ORGANIZED HEALTH CARE EDUCATION/TRAINING PROGRAM

## 2024-02-06 PROCEDURE — 82947 ASSAY GLUCOSE BLOOD QUANT: CPT

## 2024-02-06 PROCEDURE — 2500000005 HC RX 250 GENERAL PHARMACY W/O HCPCS: Performed by: ANESTHESIOLOGIST ASSISTANT

## 2024-02-06 PROCEDURE — 3700000002 HC GENERAL ANESTHESIA TIME - EACH INCREMENTAL 1 MINUTE: Performed by: STUDENT IN AN ORGANIZED HEALTH CARE EDUCATION/TRAINING PROGRAM

## 2024-02-06 PROCEDURE — 2500000004 HC RX 250 GENERAL PHARMACY W/ HCPCS (ALT 636 FOR OP/ED): Performed by: ANESTHESIOLOGIST ASSISTANT

## 2024-02-06 PROCEDURE — G0416 PROSTATE BIOPSY, ANY MTHD: HCPCS | Performed by: PATHOLOGY

## 2024-02-06 PROCEDURE — 7100000002 HC RECOVERY ROOM TIME - EACH INCREMENTAL 1 MINUTE: Performed by: STUDENT IN AN ORGANIZED HEALTH CARE EDUCATION/TRAINING PROGRAM

## 2024-02-06 PROCEDURE — 2500000004 HC RX 250 GENERAL PHARMACY W/ HCPCS (ALT 636 FOR OP/ED): Performed by: STUDENT IN AN ORGANIZED HEALTH CARE EDUCATION/TRAINING PROGRAM

## 2024-02-06 PROCEDURE — 3700000001 HC GENERAL ANESTHESIA TIME - INITIAL BASE CHARGE: Performed by: STUDENT IN AN ORGANIZED HEALTH CARE EDUCATION/TRAINING PROGRAM

## 2024-02-06 PROCEDURE — A55700 PR BIOPSY OF PROSTATE,NEEDLE/PUNCH: Performed by: ANESTHESIOLOGY

## 2024-02-06 RX ORDER — LIDOCAINE HYDROCHLORIDE 10 MG/ML
0.1 INJECTION INFILTRATION; PERINEURAL ONCE
Status: DISCONTINUED | OUTPATIENT
Start: 2024-02-06 | End: 2024-02-06 | Stop reason: HOSPADM

## 2024-02-06 RX ORDER — BUPIVACAINE HYDROCHLORIDE 2.5 MG/ML
INJECTION, SOLUTION INFILTRATION; PERINEURAL AS NEEDED
Status: DISCONTINUED | OUTPATIENT
Start: 2024-02-06 | End: 2024-02-06 | Stop reason: HOSPADM

## 2024-02-06 RX ORDER — PROPOFOL 10 MG/ML
INJECTION, EMULSION INTRAVENOUS CONTINUOUS PRN
Status: DISCONTINUED | OUTPATIENT
Start: 2024-02-06 | End: 2024-02-06

## 2024-02-06 RX ORDER — ONDANSETRON HYDROCHLORIDE 2 MG/ML
INJECTION, SOLUTION INTRAVENOUS AS NEEDED
Status: DISCONTINUED | OUTPATIENT
Start: 2024-02-06 | End: 2024-02-06

## 2024-02-06 RX ORDER — METOCLOPRAMIDE HYDROCHLORIDE 5 MG/ML
10 INJECTION INTRAMUSCULAR; INTRAVENOUS ONCE AS NEEDED
Status: DISCONTINUED | OUTPATIENT
Start: 2024-02-06 | End: 2024-02-06 | Stop reason: HOSPADM

## 2024-02-06 RX ORDER — LIDOCAINE HYDROCHLORIDE 10 MG/ML
INJECTION, SOLUTION EPIDURAL; INFILTRATION; INTRACAUDAL; PERINEURAL AS NEEDED
Status: DISCONTINUED | OUTPATIENT
Start: 2024-02-06 | End: 2024-02-06

## 2024-02-06 RX ORDER — DEXAMETHASONE SODIUM PHOSPHATE 4 MG/ML
INJECTION, SOLUTION INTRA-ARTICULAR; INTRALESIONAL; INTRAMUSCULAR; INTRAVENOUS; SOFT TISSUE AS NEEDED
Status: DISCONTINUED | OUTPATIENT
Start: 2024-02-06 | End: 2024-02-06

## 2024-02-06 RX ORDER — ACETAMINOPHEN 325 MG/1
650 TABLET ORAL EVERY 4 HOURS PRN
Status: DISCONTINUED | OUTPATIENT
Start: 2024-02-06 | End: 2024-02-06 | Stop reason: HOSPADM

## 2024-02-06 RX ORDER — SODIUM CHLORIDE, SODIUM LACTATE, POTASSIUM CHLORIDE, CALCIUM CHLORIDE 600; 310; 30; 20 MG/100ML; MG/100ML; MG/100ML; MG/100ML
100 INJECTION, SOLUTION INTRAVENOUS CONTINUOUS
Status: DISCONTINUED | OUTPATIENT
Start: 2024-02-06 | End: 2024-02-06 | Stop reason: HOSPADM

## 2024-02-06 RX ORDER — FENTANYL CITRATE 50 UG/ML
INJECTION, SOLUTION INTRAMUSCULAR; INTRAVENOUS AS NEEDED
Status: DISCONTINUED | OUTPATIENT
Start: 2024-02-06 | End: 2024-02-06

## 2024-02-06 RX ORDER — PROPOFOL 10 MG/ML
INJECTION, EMULSION INTRAVENOUS AS NEEDED
Status: DISCONTINUED | OUTPATIENT
Start: 2024-02-06 | End: 2024-02-06

## 2024-02-06 RX ORDER — KETOROLAC TROMETHAMINE 30 MG/ML
INJECTION, SOLUTION INTRAMUSCULAR; INTRAVENOUS AS NEEDED
Status: DISCONTINUED | OUTPATIENT
Start: 2024-02-06 | End: 2024-02-06

## 2024-02-06 RX ORDER — ONDANSETRON HYDROCHLORIDE 2 MG/ML
4 INJECTION, SOLUTION INTRAVENOUS ONCE AS NEEDED
Status: DISCONTINUED | OUTPATIENT
Start: 2024-02-06 | End: 2024-02-06 | Stop reason: HOSPADM

## 2024-02-06 RX ORDER — CEFTRIAXONE 2 G/50ML
2 INJECTION, SOLUTION INTRAVENOUS ONCE
Status: COMPLETED | OUTPATIENT
Start: 2024-02-06 | End: 2024-02-06

## 2024-02-06 RX ADMIN — LIDOCAINE HYDROCHLORIDE 50 MG: 10 INJECTION, SOLUTION EPIDURAL; INFILTRATION; INTRACAUDAL; PERINEURAL at 08:02

## 2024-02-06 RX ADMIN — DEXAMETHASONE SODIUM PHOSPHATE 4 MG: 4 INJECTION, SOLUTION INTRAMUSCULAR; INTRAVENOUS at 08:02

## 2024-02-06 RX ADMIN — SODIUM CHLORIDE, SODIUM LACTATE, POTASSIUM CHLORIDE, AND CALCIUM CHLORIDE 100 ML/HR: 600; 310; 30; 20 INJECTION, SOLUTION INTRAVENOUS at 06:39

## 2024-02-06 RX ADMIN — ONDANSETRON 4 MG: 2 INJECTION INTRAMUSCULAR; INTRAVENOUS at 08:02

## 2024-02-06 RX ADMIN — PROPOFOL 80 MCG/KG/MIN: 10 INJECTION, EMULSION INTRAVENOUS at 08:03

## 2024-02-06 RX ADMIN — CEFTRIAXONE SODIUM 2 G: 2 INJECTION, SOLUTION INTRAVENOUS at 08:05

## 2024-02-06 RX ADMIN — FENTANYL CITRATE 25 MCG: 50 INJECTION INTRAMUSCULAR; INTRAVENOUS at 08:10

## 2024-02-06 RX ADMIN — KETOROLAC TROMETHAMINE 30 MG: 30 INJECTION INTRAMUSCULAR; INTRAVENOUS at 08:17

## 2024-02-06 RX ADMIN — PROPOFOL 80 MG: 10 INJECTION, EMULSION INTRAVENOUS at 08:02

## 2024-02-06 RX ADMIN — FENTANYL CITRATE 50 MCG: 50 INJECTION INTRAMUSCULAR; INTRAVENOUS at 08:02

## 2024-02-06 SDOH — HEALTH STABILITY: MENTAL HEALTH: CURRENT SMOKER: 0

## 2024-02-06 ASSESSMENT — COLUMBIA-SUICIDE SEVERITY RATING SCALE - C-SSRS
1. IN THE PAST MONTH, HAVE YOU WISHED YOU WERE DEAD OR WISHED YOU COULD GO TO SLEEP AND NOT WAKE UP?: NO
2. HAVE YOU ACTUALLY HAD ANY THOUGHTS OF KILLING YOURSELF?: NO

## 2024-02-06 ASSESSMENT — PAIN - FUNCTIONAL ASSESSMENT
PAIN_FUNCTIONAL_ASSESSMENT: 0-10

## 2024-02-06 ASSESSMENT — PAIN SCALES - GENERAL
PAINLEVEL_OUTOF10: 0 - NO PAIN
PAIN_LEVEL: 3
PAINLEVEL_OUTOF10: 0 - NO PAIN

## 2024-02-06 NOTE — H&P
History Of Present Illness  Boni Gongora is a 78 y.o. male presenting with elevated psa.     Past Medical History  Past Medical History:   Diagnosis Date    BPH (benign prostatic hyperplasia)     CPAP (continuous positive airway pressure) dependence     Diabetes mellitus (CMS/HCC)     Diplopia 05/11/2016    Binocular vision disorder with diplopia    Diplopia 05/10/2016    Binocular vision disorder with diplopia    Diplopia 05/10/2016    Binocular vision disorder with diplopia    Diplopia 05/10/2016    Binocular vision disorder with diplopia    Diplopia 05/10/2016    Binocular vision disorder with diplopia    Hesitancy of micturition 09/16/2015    Hesitancy of micturition    Hyperlipidemia     Hypertension     Personal history of other endocrine, nutritional and metabolic disease     History of type 2 diabetes mellitus    Personal history of other mental and behavioral disorders 03/24/2014    History of anxiety disorder    Sleep apnea     Urinary tract infection, site not specified 10/28/2019    Acute UTI (urinary tract infection)    Vitamin D deficiency, unspecified 06/04/2014    Vitamin D deficiency       Surgical History  Past Surgical History:   Procedure Laterality Date    ADENOIDECTOMY      BARIATRIC SURGERY      KIDNEY STONE SURGERY      KNEE SURGERY  08/18/2014    Knee Surgery    OTHER SURGICAL HISTORY  06/09/2021    Cataract surgery    OTHER SURGICAL HISTORY  04/18/2022    Gastric Surgery For Morbid Obesity Laparoscopic Longitudinal Gastrectomy        Social History  He reports that he quit smoking about 56 years ago. His smoking use included cigarettes. He started smoking about 61 years ago. He has a 2.50 pack-year smoking history. He has never used smokeless tobacco. He reports that he does not currently use alcohol. He reports that he does not use drugs.    Family History  Family History   Problem Relation Name Age of Onset    Lymphoma Mother          non-hodgkins    Diabetes Father         "  Allergies  Morphine and Pollen extracts    Review of Systems     Physical Exam     Last Recorded Vitals  Blood pressure 147/77, pulse 81, temperature 36.7 °C (98.1 °F), temperature source Temporal, resp. rate 18, height 1.753 m (5' 9\"), weight 73.4 kg (161 lb 13.1 oz), SpO2 97 %.    Relevant Results             Assessment/Plan   Principal Problem:    Elevated PSA      biopsy       I spent  minutes in the professional and overall care of this patient.      Jose David Gordon MD    "

## 2024-02-06 NOTE — PERIOPERATIVE NURSING NOTE
Patient in Phase 2; dressed and up to chair with RN assist. Tolerating po fluids, no complaint of pain and no complaint of nausea.     Significant other at bedside; discussed discharge instructions with patient and Significant other. All questions at this time answered.     Patient clinically appropriate for discharge. IV removed and patient transported to discharge area via wheelchair.     Glucose 108 on pts own monitor.

## 2024-02-06 NOTE — POST-PROCEDURE NOTE
Pt arrived to PACU, orders reviewed, pt stable    0857- pt noted to have some bloody drainage to rectal area, drainage cleaned and reported to discharge rn.

## 2024-02-06 NOTE — ANESTHESIA PREPROCEDURE EVALUATION
Patient: Boni Gongora    Procedure Information       Date/Time: 02/06/24 0745    Procedure: Fusion prostate biopsy (Uronav, Transrectal US,MRI @ [facility] )    Location: [facility]  / [facility]    Surgeons: Jose David Gordon MD            Relevant Problems   Cardiovascular   (+) Benign essential hypertension   (+) Hypercholesterolemia      Endocrine   (+) Diabetic retinopathy, background (CMS/HCC)      GI   (+) GERD (gastroesophageal reflux disease)      /Renal   (+) Kidney stone      Pulmonary   (+) Obstructive sleep apnea      Infectious Disease   (+) Helicobacter positive gastritis       Clinical information reviewed:   Tobacco  Allergies  Meds  Problems  Med Hx  Surg Hx   Fam Hx  Soc   Hx        NPO Detail:  NPO/Void Status  Date of Last Liquid: 02/05/24  Time of Last Liquid: 2200  Date of Last Solid: 02/05/24  Time of Last Solid: 2130  Last Intake Type: Clear fluids  Time of Last Void: 0530         Physical Exam    Airway  Mallampati: II  TM distance: >3 FB  Neck ROM: full     Cardiovascular   Rhythm: regular     Dental - normal exam     Pulmonary   Breath sounds clear to auscultation     Abdominal            Anesthesia Plan    History of general anesthesia?: yes  History of complications of general anesthesia?: no    ASA 3     MAC     The patient is not a current smoker.  Patient was previously instructed to abstain from smoking on day of procedure.  Patient did not smoke on day of procedure.  Education provided regarding risk of obstructive sleep apnea.  intravenous induction   Postoperative administration of opioids is intended.  Anesthetic plan and risks discussed with patient and spouse.    Plan discussed with CAA.

## 2024-02-06 NOTE — ANESTHESIA POSTPROCEDURE EVALUATION
Patient: Boni Gongora    Procedure Summary       Date: 02/06/24 Room / Location: [facility] / [facility]    Anesthesia Start: 0758 Anesthesia Stop: 0833    Procedure: Fusion prostate biopsy (Uronav, Transrectal US,MRI @ [facility] ) Diagnosis:       Elevated PSA      (Elevated PSA [R97.20])    Surgeons: Jose David Gordon MD Responsible Provider: Berkley Payton DO    Anesthesia Type: MAC ASA Status: 3            Anesthesia Type: MAC    Vitals Value Taken Time   /87 02/06/24 0857   Temp 36.4 °C (97.5 °F) 02/06/24 0828   Pulse 72 02/06/24 0857   Resp 18 02/06/24 0857   SpO2 97 % 02/06/24 0857       Anesthesia Post Evaluation    Patient location during evaluation: PACU  Patient participation: complete - patient participated  Level of consciousness: awake  Pain score: 3  Pain management: adequate  Airway patency: patent  Cardiovascular status: acceptable  Respiratory status: acceptable  Hydration status: acceptable  Postoperative Nausea and Vomiting: none        There were no known notable events for this encounter.

## 2024-02-06 NOTE — OP NOTE
Fusion prostate biopsy (Uronav, Transrectal US,MRI @ [facility] ) Operative Note     Date: 2024  OR Location: [facility]    Name: Boni Gongora, : 1945, Age: 78 y.o., MRN: [id number], Sex: male    Diagnosis  Pre-op Diagnosis     * Elevated PSA [R97.20] Post-op Diagnosis     * Elevated PSA [R97.20]     Procedures  Fusion prostate biopsy (Uronav, Transrectal US,MRI @ [facility] )  43309 - LA PROSTATE NEEDLE BIOPSY ANY APPROACH    LA PROSTATE NEEDLE BIOPSY ANY APPROACH [76048]  CHG US TRANSRECTAL [52931]  Surgeons      * Jose David Gordon - Primary    Resident/Fellow/Other Assistant:  Surgeon(s) and Role:      Estimated Blood Loss (ml)  5.   Specimen(s) Removed  Removed region of interest and systematic biopsies.   Drain(s)  None.   Implant(s)  None.   Complications  None.   Indications (History)  Elevated PSA.   Findings of Procedure  30g prostate, vincent hypoechoic.   Description of Procedure  After administration of anesthesia, a prostate block was performed and transrectal ultrasound. Transperineal biopsies taken from region of interest and systematic template performed using the precision point device.

## 2024-02-26 LAB
LAB AP ASR DISCLAIMER: NORMAL
LABORATORY COMMENT REPORT: NORMAL
PATH REPORT.FINAL DX SPEC: NORMAL
PATH REPORT.GROSS SPEC: NORMAL
PATH REPORT.RELEVANT HX SPEC: NORMAL
PATH REPORT.TOTAL CANCER: NORMAL

## 2024-02-26 PROCEDURE — 88344 IMHCHEM/IMCYTCHM EA MLT ANTB: CPT | Performed by: PATHOLOGY

## 2024-02-28 ENCOUNTER — APPOINTMENT (OUTPATIENT)
Dept: UROLOGY | Facility: CLINIC | Age: 79
End: 2024-02-28
Payer: MEDICARE

## 2024-03-07 ENCOUNTER — OFFICE VISIT (OUTPATIENT)
Dept: UROLOGY | Facility: CLINIC | Age: 79
End: 2024-03-07
Payer: MEDICARE

## 2024-03-07 VITALS — HEIGHT: 69 IN | WEIGHT: 162.8 LBS | BODY MASS INDEX: 24.11 KG/M2 | TEMPERATURE: 96.6 F

## 2024-03-07 DIAGNOSIS — C61 PROSTATE CANCER (MULTI): Primary | ICD-10-CM

## 2024-03-07 PROCEDURE — 1126F AMNT PAIN NOTED NONE PRSNT: CPT | Performed by: STUDENT IN AN ORGANIZED HEALTH CARE EDUCATION/TRAINING PROGRAM

## 2024-03-07 PROCEDURE — 1159F MED LIST DOCD IN RCRD: CPT | Performed by: STUDENT IN AN ORGANIZED HEALTH CARE EDUCATION/TRAINING PROGRAM

## 2024-03-07 PROCEDURE — 1036F TOBACCO NON-USER: CPT | Performed by: STUDENT IN AN ORGANIZED HEALTH CARE EDUCATION/TRAINING PROGRAM

## 2024-03-07 PROCEDURE — 99214 OFFICE O/P EST MOD 30 MIN: CPT | Performed by: STUDENT IN AN ORGANIZED HEALTH CARE EDUCATION/TRAINING PROGRAM

## 2024-03-07 ASSESSMENT — PAIN SCALES - GENERAL: PAINLEVEL: 0-NO PAIN

## 2024-03-07 NOTE — PROGRESS NOTES
HPI:  Proc (2/6/24): TP prostate biopsy   Path: prostatic adenocarcinoma, MARLA #1 GG2 ([place] score 3+4=7), 5/6 cores (90% of tissue), pattern 4 (10%), acinar type, ASAP    Boni Gongora is a 78 y.o. male referred by Dr. Morataya for elevated PSA. Hx of ED, kidney stones, HTN, DM2,GERD, HEMANT. PSA 7.85 (9/5/23). MRI Prostate (11/7/23) showed 34g, PI-RADS 4 lesion within the left posteromedial PZ at the basilar gland, lesion abuts the capsule and the medial aspect of the left seminal vesicle raising concern for extracapsular extension, few nonenlarged mesorectal lymph nodes which are indeterminate, changes associated with BPH including nodules (PI-RADS 2). S/p TP prostate biopsy (2/6/24) with pathology showing prostatic adenocarcinoma, MARLA #1 GG2 ([place] score 3+4=7), 5/6 cores (90% of tissue), pattern 4 (10%), acinar type, ASAP. Doing well.     PSA: 7.85 (9/5/23)    MRI Prostate (11/7/23): 34g, PI-RADS 4 lesion within the left posteromedial PZ at the basilar gland, lesion abuts the capsule and the medial aspect of the left seminal vesicle raising concern for extracapsular extension, few nonenlarged mesorectal lymph nodes which are indeterminate, changes associated with BPH including nodules (PI-RADS 2).    Review of Systems:  All systems reviewed. Anything negative noted in the HPI.    Physical Exam:  Vitals signs reviewed.  Constitutional:      Appearance: Well-developed.  HENT:     Head: Normocephalic and atraumatic.  Neck:     Musculoskeletal: Normal range of motion.  Pulmonary:     Effort: Pulmonary effort is normal.  Musculoskeletal: Normal range of motion.  Skin:     General: Skin is warm and dry.  Neurological:     Mental Status: Alert and oriented to person, place, and time.  Psychiatric:        Behavior: Behavior normal.        Thought Content: Thought content normal.        Judgment: Judgment normal.    Assessment/Plan   Boni Gongora is a 78 y.o. male referred by Dr. Morataya for elevated PSA. Hx  of ED, kidney stones, HTN, DM2,GERD, HEMANT. PSA 7.85 (9/5/23). MRI Prostate (11/7/23) showed 34g, PI-RADS 4 lesion within the left posteromedial PZ at the basilar gland, lesion abuts the capsule and the medial aspect of the left seminal vesicle raising concern for extracapsular extension, few nonenlarged mesorectal lymph nodes which are indeterminate, changes associated with BPH including nodules (PI-RADS 2). S/p TP prostate biopsy (2/6/24) with pathology showing prostatic adenocarcinoma, MARLA #1 GG2 ([place] score 3+4=7), 5/6 cores (90% of tissue), pattern 4 (10%), acinar type, ASAP. Doing well. Management options including risks, benefits and alternatives discussed at length and all questions answered. Patient prefers to proceed with follow-up in June with PSA.            By signing my name below, I, Lupe Cook, attest that this documentation has been prepared under the direction and in the presence of Dr. Jose David Gordon.  All medical record entries made by the Scribe were at my direction and personally dictated by me. I have reviewed the chart and agree that the record accurately reflects my personal performance of the history, physical exam, discussion and plan.

## 2024-03-19 ENCOUNTER — OFFICE VISIT (OUTPATIENT)
Dept: OPHTHALMOLOGY | Facility: CLINIC | Age: 79
End: 2024-03-19
Payer: MEDICARE

## 2024-03-19 DIAGNOSIS — E11.3299 DIABETIC RETINOPATHY, BACKGROUND (MULTI): Primary | ICD-10-CM

## 2024-03-19 PROCEDURE — 99213 OFFICE O/P EST LOW 20 MIN: CPT | Performed by: OPHTHALMOLOGY

## 2024-03-19 PROCEDURE — 92134 CPTRZ OPH DX IMG PST SGM RTA: CPT | Performed by: OPHTHALMOLOGY

## 2024-03-19 ASSESSMENT — ENCOUNTER SYMPTOMS
EYES NEGATIVE: 0
MUSCULOSKELETAL NEGATIVE: 0
ENDOCRINE NEGATIVE: 0
NEUROLOGICAL NEGATIVE: 0
PSYCHIATRIC NEGATIVE: 0
CONSTITUTIONAL NEGATIVE: 0
GASTROINTESTINAL NEGATIVE: 0
ALLERGIC/IMMUNOLOGIC NEGATIVE: 0
RESPIRATORY NEGATIVE: 0
CARDIOVASCULAR NEGATIVE: 0
HEMATOLOGIC/LYMPHATIC NEGATIVE: 0

## 2024-03-19 ASSESSMENT — CONF VISUAL FIELD
OS_SUPERIOR_TEMPORAL_RESTRICTION: 0
OS_INFERIOR_NASAL_RESTRICTION: 0
OD_SUPERIOR_NASAL_RESTRICTION: 0
OS_SUPERIOR_NASAL_RESTRICTION: 0
OD_INFERIOR_TEMPORAL_RESTRICTION: 0
OD_INFERIOR_NASAL_RESTRICTION: 0
METHOD: COUNTING FINGERS
OS_NORMAL: 1
OD_NORMAL: 1
OS_INFERIOR_TEMPORAL_RESTRICTION: 0
OD_SUPERIOR_TEMPORAL_RESTRICTION: 0

## 2024-03-19 ASSESSMENT — VISUAL ACUITY
METHOD: SNELLEN - LINEAR
OS_PH_CC: 20/30
OD_CC: 20/25
OS_PH_CC+: -2
OD_CC+: -2
OS_CC: 20/40

## 2024-03-19 ASSESSMENT — CUP TO DISC RATIO
OD_RATIO: .3
OS_RATIO: .3

## 2024-03-19 ASSESSMENT — SLIT LAMP EXAM - LIDS
COMMENTS: GOOD POSITION
COMMENTS: GOOD POSITION

## 2024-03-19 ASSESSMENT — TONOMETRY
OD_IOP_MMHG: 13
OS_IOP_MMHG: 13
IOP_METHOD: GOLDMANN APPLANATION

## 2024-03-19 ASSESSMENT — EXTERNAL EXAM - LEFT EYE: OS_EXAM: NORMAL

## 2024-03-19 ASSESSMENT — EXTERNAL EXAM - RIGHT EYE: OD_EXAM: NORMAL

## 2024-03-19 NOTE — PROGRESS NOTES
Assessment/Plan   Diagnoses and all orders for this visit:  Diabetic retinopathy, background (CMS/[facility])  -     OCT, Retina - OU - Both Eyes  Mild and stable  No macular edema  Re check 6 months

## 2024-04-15 DIAGNOSIS — E11.42 TYPE 2 DIABETES MELLITUS WITH DIABETIC POLYNEUROPATHY, WITHOUT LONG-TERM CURRENT USE OF INSULIN (MULTI): Primary | ICD-10-CM

## 2024-04-24 DIAGNOSIS — E11.42 TYPE 2 DIABETES MELLITUS WITH DIABETIC POLYNEUROPATHY, WITHOUT LONG-TERM CURRENT USE OF INSULIN (MULTI): Primary | ICD-10-CM

## 2024-04-24 RX ORDER — FLASH GLUCOSE SCANNING READER
EACH MISCELLANEOUS
Qty: 1 EACH | Refills: 0 | Status: SHIPPED | OUTPATIENT
Start: 2024-04-24

## 2024-05-01 ENCOUNTER — APPOINTMENT (OUTPATIENT)
Dept: PHARMACY | Facility: HOSPITAL | Age: 79
End: 2024-05-01
Payer: MEDICARE

## 2024-05-17 DIAGNOSIS — E11.42 TYPE 2 DIABETES MELLITUS WITH DIABETIC POLYNEUROPATHY, WITHOUT LONG-TERM CURRENT USE OF INSULIN (MULTI): ICD-10-CM

## 2024-05-21 RX ORDER — GABAPENTIN 100 MG/1
CAPSULE ORAL
Qty: 90 CAPSULE | Refills: 2 | Status: SHIPPED | OUTPATIENT
Start: 2024-05-21

## 2024-06-11 ENCOUNTER — TELEPHONE (OUTPATIENT)
Dept: PRIMARY CARE | Facility: CLINIC | Age: 79
End: 2024-06-11

## 2024-06-11 DIAGNOSIS — E11.42 TYPE 2 DIABETES MELLITUS WITH DIABETIC POLYNEUROPATHY, WITHOUT LONG-TERM CURRENT USE OF INSULIN (MULTI): Primary | ICD-10-CM

## 2024-06-12 ENCOUNTER — LAB (OUTPATIENT)
Dept: LAB | Facility: LAB | Age: 79
End: 2024-06-12
Payer: MEDICARE

## 2024-06-12 DIAGNOSIS — E11.42 TYPE 2 DIABETES MELLITUS WITH DIABETIC POLYNEUROPATHY, WITHOUT LONG-TERM CURRENT USE OF INSULIN (MULTI): ICD-10-CM

## 2024-06-12 DIAGNOSIS — C61 PROSTATE CANCER (MULTI): ICD-10-CM

## 2024-06-12 LAB
ALBUMIN SERPL BCP-MCNC: 4.1 G/DL (ref 3.4–5)
ALP SERPL-CCNC: 47 U/L (ref 33–136)
ALT SERPL W P-5'-P-CCNC: 9 U/L (ref 10–52)
ANION GAP SERPL CALC-SCNC: 15 MMOL/L (ref 10–20)
AST SERPL W P-5'-P-CCNC: 4 U/L (ref 9–39)
BASOPHILS # BLD AUTO: 0.02 X10*3/UL (ref 0–0.1)
BASOPHILS NFR BLD AUTO: 0.3 %
BILIRUB SERPL-MCNC: 0.6 MG/DL (ref 0–1.2)
BUN SERPL-MCNC: 19 MG/DL (ref 6–23)
CALCIUM SERPL-MCNC: 9.9 MG/DL (ref 8.6–10.6)
CHLORIDE SERPL-SCNC: 102 MMOL/L (ref 98–107)
CHOLEST SERPL-MCNC: 132 MG/DL (ref 0–199)
CHOLESTEROL/HDL RATIO: 2
CO2 SERPL-SCNC: 29 MMOL/L (ref 21–32)
CREAT SERPL-MCNC: 0.87 MG/DL (ref 0.5–1.3)
CREAT UR-MCNC: 45.3 MG/DL (ref 20–370)
EGFRCR SERPLBLD CKD-EPI 2021: 88 ML/MIN/1.73M*2
EOSINOPHIL # BLD AUTO: 0.23 X10*3/UL (ref 0–0.4)
EOSINOPHIL NFR BLD AUTO: 3.1 %
ERYTHROCYTE [DISTWIDTH] IN BLOOD BY AUTOMATED COUNT: 12.9 % (ref 11.5–14.5)
GLUCOSE SERPL-MCNC: 202 MG/DL (ref 74–99)
HCT VFR BLD AUTO: 46.3 % (ref 41–52)
HDLC SERPL-MCNC: 65.6 MG/DL
HGB BLD-MCNC: 14.5 G/DL (ref 13.5–17.5)
IMM GRANULOCYTES # BLD AUTO: 0.01 X10*3/UL (ref 0–0.5)
IMM GRANULOCYTES NFR BLD AUTO: 0.1 % (ref 0–0.9)
LDLC SERPL CALC-MCNC: 40 MG/DL
LYMPHOCYTES # BLD AUTO: 2.29 X10*3/UL (ref 0.8–3)
LYMPHOCYTES NFR BLD AUTO: 30.9 %
MCH RBC QN AUTO: 29.5 PG (ref 26–34)
MCHC RBC AUTO-ENTMCNC: 31.3 G/DL (ref 32–36)
MCV RBC AUTO: 94 FL (ref 80–100)
MICROALBUMIN UR-MCNC: <7 MG/L
MICROALBUMIN/CREAT UR: NORMAL MG/G{CREAT}
MONOCYTES # BLD AUTO: 0.63 X10*3/UL (ref 0.05–0.8)
MONOCYTES NFR BLD AUTO: 8.5 %
NEUTROPHILS # BLD AUTO: 4.23 X10*3/UL (ref 1.6–5.5)
NEUTROPHILS NFR BLD AUTO: 57.1 %
NON HDL CHOLESTEROL: 66 MG/DL (ref 0–149)
NRBC BLD-RTO: 0 /100 WBCS (ref 0–0)
PLATELET # BLD AUTO: 145 X10*3/UL (ref 150–450)
POTASSIUM SERPL-SCNC: 5.2 MMOL/L (ref 3.5–5.3)
PROT SERPL-MCNC: 6.6 G/DL (ref 6.4–8.2)
PSA SERPL-MCNC: 8.3 NG/ML
RBC # BLD AUTO: 4.92 X10*6/UL (ref 4.5–5.9)
SODIUM SERPL-SCNC: 141 MMOL/L (ref 136–145)
TRIGL SERPL-MCNC: 131 MG/DL (ref 0–149)
VLDL: 26 MG/DL (ref 0–40)
WBC # BLD AUTO: 7.4 X10*3/UL (ref 4.4–11.3)

## 2024-06-12 PROCEDURE — 82043 UR ALBUMIN QUANTITATIVE: CPT

## 2024-06-12 PROCEDURE — 82570 ASSAY OF URINE CREATININE: CPT

## 2024-06-12 PROCEDURE — 80061 LIPID PANEL: CPT

## 2024-06-12 PROCEDURE — 85025 COMPLETE CBC W/AUTO DIFF WBC: CPT

## 2024-06-12 PROCEDURE — 84153 ASSAY OF PSA TOTAL: CPT

## 2024-06-12 PROCEDURE — 83036 HEMOGLOBIN GLYCOSYLATED A1C: CPT

## 2024-06-12 PROCEDURE — 80053 COMPREHEN METABOLIC PANEL: CPT

## 2024-06-12 PROCEDURE — 36415 COLL VENOUS BLD VENIPUNCTURE: CPT

## 2024-06-13 LAB
EST. AVERAGE GLUCOSE BLD GHB EST-MCNC: 154 MG/DL
HBA1C MFR BLD: 7 %

## 2024-06-17 ENCOUNTER — APPOINTMENT (OUTPATIENT)
Dept: PRIMARY CARE | Facility: CLINIC | Age: 79
End: 2024-06-17
Payer: MEDICARE

## 2024-06-17 VITALS
TEMPERATURE: 98.2 F | WEIGHT: 163.6 LBS | DIASTOLIC BLOOD PRESSURE: 59 MMHG | BODY MASS INDEX: 24.23 KG/M2 | HEIGHT: 69 IN | SYSTOLIC BLOOD PRESSURE: 100 MMHG | HEART RATE: 89 BPM

## 2024-06-17 DIAGNOSIS — C61 PROSTATE CANCER (MULTI): ICD-10-CM

## 2024-06-17 DIAGNOSIS — Z00.00 PREVENTATIVE HEALTH CARE: Primary | ICD-10-CM

## 2024-06-17 DIAGNOSIS — I49.49 PREMATURE BEATS: ICD-10-CM

## 2024-06-17 DIAGNOSIS — E11.40 TYPE 2 DIABETES MELLITUS WITH DIABETIC NEUROPATHY, WITHOUT LONG-TERM CURRENT USE OF INSULIN (MULTI): ICD-10-CM

## 2024-06-17 PROBLEM — M89.9 ABNORMAL MOVEMENT IN BONE: Status: RESOLVED | Noted: 2023-02-12 | Resolved: 2024-06-17

## 2024-06-17 PROCEDURE — 99214 OFFICE O/P EST MOD 30 MIN: CPT | Performed by: INTERNAL MEDICINE

## 2024-06-17 PROCEDURE — G0439 PPPS, SUBSEQ VISIT: HCPCS | Performed by: INTERNAL MEDICINE

## 2024-06-17 PROCEDURE — 1126F AMNT PAIN NOTED NONE PRSNT: CPT | Performed by: INTERNAL MEDICINE

## 2024-06-17 PROCEDURE — 1170F FXNL STATUS ASSESSED: CPT | Performed by: INTERNAL MEDICINE

## 2024-06-17 PROCEDURE — 3078F DIAST BP <80 MM HG: CPT | Performed by: INTERNAL MEDICINE

## 2024-06-17 PROCEDURE — 93000 ELECTROCARDIOGRAM COMPLETE: CPT | Performed by: INTERNAL MEDICINE

## 2024-06-17 PROCEDURE — 3074F SYST BP LT 130 MM HG: CPT | Performed by: INTERNAL MEDICINE

## 2024-06-17 RX ORDER — GABAPENTIN 300 MG/1
300 CAPSULE ORAL 2 TIMES DAILY
Qty: 180 CAPSULE | Refills: 0 | Status: SHIPPED | OUTPATIENT
Start: 2024-06-17 | End: 2024-09-15

## 2024-06-17 ASSESSMENT — ACTIVITIES OF DAILY LIVING (ADL)
DRESSING: INDEPENDENT
GROCERY_SHOPPING: INDEPENDENT
BATHING: INDEPENDENT
TAKING_MEDICATION: INDEPENDENT
DOING_HOUSEWORK: INDEPENDENT
MANAGING_FINANCES: INDEPENDENT

## 2024-06-17 ASSESSMENT — PATIENT HEALTH QUESTIONNAIRE - PHQ9
2. FEELING DOWN, DEPRESSED OR HOPELESS: NOT AT ALL
SUM OF ALL RESPONSES TO PHQ9 QUESTIONS 1 AND 2: 0
1. LITTLE INTEREST OR PLEASURE IN DOING THINGS: NOT AT ALL

## 2024-06-17 ASSESSMENT — PAIN SCALES - GENERAL: PAINLEVEL: 0-NO PAIN

## 2024-06-17 NOTE — ASSESSMENT & PLAN NOTE
I would lean more to treatment than to active surveillance. I encouraged arash to discuss this with Dr. Gordon and to consider a consult with radiation oncology.

## 2024-06-17 NOTE — PROGRESS NOTES
Subjective   Reason for Visit: Boni Gongora is an 79 y.o. male here for a Medicare Wellness visit.               HPI    Patient Care Team:  Darrick Morataya MD as PCP - General  Darrick Morataya MD as PCP - [facility] Attributed Provider   Current Outpatient Medications   Medication Instructions   • cholecalciferol (Vitamin D-3) 25 MCG (1000 UT) capsule Take as directed twice per week  Indications: bariatric procedure   • cholecalciferol (VITAMIN D-3) 25 mcg, oral, 2 times weekly   • cyanocobalamin, vitamin B-12, 1,000 mcg tablet, sublingual 1 tablet, sublingual, 2 times weekly   • diphenhydrAMINE (BENADryl) 25 mg capsule No dose, route, or frequency recorded.   • ferrous sulfate 325 (65 Fe) MG tablet Take as directed twice per week  Indications: bariatric procedure   • ferrous sulfate 65 mg, oral, 2 times weekly   • flash glucose sensor (FREESTYLE MELVIN 2 SENSOR MISC) Apply 1 sensor every 14 days to the back of upper arm   • FLUoxetine (PROZAC) 20 mg, oral, Every other day   • FreeStyle Melvin 2 [place] misc Use as instructed   • FreeStyle Melvin 2 Sensor kit APPLY 1 SENSOR EVERY 14 DAYS TO THE BACK OF UPPER ARM.   • gabapentin (Neurontin) 100 mg capsule TAKE AN ADDITIONAL 100MG CAPSULE AT HS FOR NEUROPATHIC SYMPTOMS. INCREASE DOSE IN 100MG INCREMENTS IN 2 WEEK INTERVALS UP TO AN EXTRA 300MG AT HS.   • gabapentin (Neurontin) 300 mg capsule TAKE 1 CAPSULE BY MOUTH TWICE A DAY   • Jardiance 25 mg, oral, Daily   • metFORMIN (GLUCOPHAGE) 1,000 mg, oral, 2 times daily (morning and late afternoon)   • MULTIVITAMIN ORAL 1 tablet, oral, Daily   • omeprazole (PRILOSEC) 20 mg, oral, Daily before breakfast   • Ozempic 1 mg, subcutaneous, Once Weekly   • ramipril (ALTACE) 5 mg, oral, Daily   • sildenafil (Viagra) 50 mg tablet 1-2 tablets, oral, 30 minutes before sexual activity   • simvastatin (ZOCOR) 20 mg, oral, Nightly   • VITAMIN B COMPLEX ORAL Take as directed twice per week  Indications: bariatric procecdure      Review  "of Systems    Objective   Vitals:  /59 (BP Location: Right arm, Patient Position: Sitting, BP Cuff Size: Adult)   Pulse 89   Temp 36.8 °C (98.2 °F) (Oral)   Ht 1.753 m (5' 9\")   Wt 74.2 kg (163 lb 9.6 oz)   BMI 24.16 kg/m²       Physical Exam    Assessment/Plan   Problem List Items Addressed This Visit    None  Visit Diagnoses       Routine general medical examination at health care facility    -  Primary                 "

## 2024-06-20 ENCOUNTER — APPOINTMENT (OUTPATIENT)
Dept: UROLOGY | Facility: CLINIC | Age: 79
End: 2024-06-20
Payer: MEDICARE

## 2024-06-20 VITALS — BODY MASS INDEX: 26.82 KG/M2 | WEIGHT: 161 LBS | HEIGHT: 65 IN

## 2024-06-20 DIAGNOSIS — C61 PROSTATE CANCER (MULTI): Primary | ICD-10-CM

## 2024-06-20 PROCEDURE — 99214 OFFICE O/P EST MOD 30 MIN: CPT | Performed by: STUDENT IN AN ORGANIZED HEALTH CARE EDUCATION/TRAINING PROGRAM

## 2024-06-20 PROCEDURE — 1159F MED LIST DOCD IN RCRD: CPT | Performed by: STUDENT IN AN ORGANIZED HEALTH CARE EDUCATION/TRAINING PROGRAM

## 2024-06-20 PROCEDURE — G2211 COMPLEX E/M VISIT ADD ON: HCPCS | Performed by: STUDENT IN AN ORGANIZED HEALTH CARE EDUCATION/TRAINING PROGRAM

## 2024-06-20 NOTE — PROGRESS NOTES
HPI:  Proc (2/6/24): TP prostate biopsy   Path: prostatic adenocarcinoma, MARLA #1 GG2 ([place] score 3+4=7), 5/6 cores (90% of tissue), pattern 4 (10%), acinar type, ASAP    Boni Gongora is a 79 y.o. male referred by Dr. Morataya for elevated PSA. Hx of ED, kidney stones, HTN, DM2, GERD, HEMANT. MRI Prostate (11/7/23) showed 34g, PI-RADS 4 lesion within the left posteromedial PZ at the basilar gland, lesion abuts the capsule and the medial aspect of the left seminal vesicle raising concern for extracapsular extension, few nonenlarged mesorectal lymph nodes which are indeterminate, changes associated with BPH including nodules (PI-RADS 2). S/p TP prostate biopsy (2/6/24) with pathology showing prostatic adenocarcinoma, MARLA #1 GG2 ([place] score 3+4=7), 5/6 cores (90% of tissue), pattern 4 (10%), acinar type, ASAP. PSA 8.30 (6/12/24). Doing well.     PSA: 8.30 (6/12/24), 7.85 (9/5/23)    MRI Prostate (11/7/23): 34g, PI-RADS 4 lesion within the left posteromedial PZ at the basilar gland, lesion abuts the capsule and the medial aspect of the left seminal vesicle raising concern for extracapsular extension, few nonenlarged mesorectal lymph nodes which are indeterminate, changes associated with BPH including nodules (PI-RADS 2).    Review of Systems:  All systems reviewed. Anything negative noted in the HPI.    Physical Exam:  Vitals signs reviewed.  Constitutional:      Appearance: Well-developed.  HENT:     Head: Normocephalic and atraumatic.  Neck:     Musculoskeletal: Normal range of motion.  Pulmonary:     Effort: Pulmonary effort is normal.  Musculoskeletal: Normal range of motion.  Skin:     General: Skin is warm and dry.  Neurological:     Mental Status: Alert and oriented to person, place, and time.  Psychiatric:        Behavior: Behavior normal.        Thought Content: Thought content normal.        Judgment: Judgment normal.    Assessment/Plan   Boni Gongora is a 78 y.o. male referred by Dr. Morataya for  elevated PSA. Hx of ED, kidney stones, HTN, DM2,GERD, HEMANT. PSA 7.85 (9/5/23). MRI Prostate (11/7/23) showed 34g, PI-RADS 4 lesion within the left posteromedial PZ at the basilar gland, lesion abuts the capsule and the medial aspect of the left seminal vesicle raising concern for extracapsular extension, few nonenlarged mesorectal lymph nodes which are indeterminate, changes associated with BPH including nodules (PI-RADS 2). S/p TP prostate biopsy (2/6/24) with pathology showing prostatic adenocarcinoma, MARLA #1 GG2 ([place] score 3+4=7), 5/6 cores (90% of tissue), pattern 4 (10%), acinar type, ASAP. PSA 8.30 (6/12/24). Doing well. Management options including risks, benefits and alternatives discussed at length and all questions answered. I counseled patient on active surveillance vs radiation therapy vs watchful waiting. Patient prefers to proceed with follow-up in 6mos with PSA and MRI Prostate.     Will refer to radiation oncology for treatment opinions.         Scribe Attestation  By signing my name below, IGinny Scribe   attest that this documentation has been prepared under the direction and in the presence of Jose David Gordon MD.

## 2024-06-27 ENCOUNTER — TELEPHONE (OUTPATIENT)
Dept: RADIATION ONCOLOGY | Facility: HOSPITAL | Age: 79
End: 2024-06-27
Payer: MEDICARE

## 2024-07-02 ENCOUNTER — HOSPITAL ENCOUNTER (OUTPATIENT)
Dept: RADIATION ONCOLOGY | Facility: HOSPITAL | Age: 79
Setting detail: RADIATION/ONCOLOGY SERIES
Discharge: HOME | End: 2024-07-02
Payer: MEDICARE

## 2024-07-02 DIAGNOSIS — C61 PROSTATE CANCER (MULTI): ICD-10-CM

## 2024-07-02 PROCEDURE — 99204 OFFICE O/P NEW MOD 45 MIN: CPT | Performed by: STUDENT IN AN ORGANIZED HEALTH CARE EDUCATION/TRAINING PROGRAM

## 2024-07-02 ASSESSMENT — PAIN SCALES - GENERAL: PAINLEVEL: 0-NO PAIN

## 2024-07-02 ASSESSMENT — ENCOUNTER SYMPTOMS
NUMBNESS: 1
CONSTITUTIONAL NEGATIVE: 1
EYES NEGATIVE: 1
CARDIOVASCULAR NEGATIVE: 1
HEMATOLOGIC/LYMPHATIC NEGATIVE: 1
RESPIRATORY NEGATIVE: 1
PSYCHIATRIC NEGATIVE: 1
ENDOCRINE NEGATIVE: 1
MUSCULOSKELETAL NEGATIVE: 1

## 2024-07-02 NOTE — PROGRESS NOTES
Radiation Oncology Nursing Note    IPSS (International Prostate Symptom Score):   0 / 35, bother 0   - He is not experiencing urinary incontinence.      - He is not experiencing dysuria, hematuria, flank pain.     Sexual function:   - Quality of erections during the last 4 weeks: 1 = None at all  - Use of erectile dysfunction medications:  None    Bowel function:    - Denies hematochezia or pain.    - He does not have a history of hemorrhoids.    - He does not have a history of inflammatory bowel disease (Crohn's, Ulcerative Colitis).    Prior Radiotherapy:  No    Current Systemic Treatment:  No     Presence of Pacemaker or ICD:  No    History of Autoimmune or Connective Tissue Disorders:  No    Pain: The patient's current pain level was assessed.  They report currently having a pain of 0 out of 10.  They feel their pain is under control without the use of pain medications.    Review of Systems:  Review of Systems   Constitutional: Negative.    HENT:  Negative.     Eyes: Negative.    Respiratory: Negative.     Cardiovascular: Negative.    Endocrine: Negative.    Genitourinary: Negative.     Musculoskeletal: Negative.    Skin: Negative.    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   Hematological: Negative.    Psychiatric/Behavioral: Negative.

## 2024-07-02 NOTE — PROGRESS NOTES
Staff Physician: Lauren Peacock MD PhD  Referring Physician: Jose David Gordon MD  Date of Service: 7/2/2024  Patient name: Boni Gongora   MRN: [id number]    RADIATION ONCOLOGY CONSULT NOTE  Virtual Visit Statement: An interactive audio and video telecommunications system which permits real-time communications between the patient at the originating site and provider at the distant site was utilized to provide this telehealth service.    Verbal consent for encounter: Verbal consent was requested and obtained from the patient on this date for a telehealth visit.    IDENTIFYING DATA:  DIAGNOSIS: Newly diagnosed, localized   Cancer Staging   Prostate cancer (Multi)  Staging form: Prostate, AJCC 8th Edition  - Clinical stage from 2/26/2024: cT1c, cN0, PSA: 8.3, Grade Group: 2 - Signed by Lauren Peacock MD PhD on 7/2/2024    DISEASE STATE: No prior treatment    Problem List Items Addressed This Visit       Prostate cancer (Multi)    Relevant Orders    Referral to Radiation Oncology     Mr. Boni Gongora is a 79-year-old with newly diagnosed prostate adenocarcinoma, referred by Jose David Chowdhury MD, for evaluation and discussion of treatment recommendations.    HISTORY OF PRESENT ILLNESS:  7/5/2023 PSA 7.85    11/7/2023 MRI prostate noted a 34 g gland, PI-RADS 4 in the left PZ abutting the medial left aspect of the last left SV.  Nonenlarged mesorectal lymph nodes, indeterminant.    2/6/2024 targeted and systematic biopsy noted GG2, noted in 1 of 4 systematic cores (<50% cores), and 1/1 targeted core+     6/12/2024 PSA 8.3    Today, Mr. Boni Gongora is on video by himself.  Symptomatically, he reports:    1.  Full independence in activities of daily living.  2.  Urinary function is normal. IPSS 0.   3.  Bowel function is normal. Does report that he had significant rectal urgency after biopsy, requiring Depends, that resolved after about 10 weeks.   4.  Normal appetite. No unintentional weight gain or  loss.   5.  Pain score: 0/10   6.  He denies a personal history of MI or stroke.  Had syncopal episodes in 2016, stress test noted EF of 47%. History of diabetes, HgbA1c is 7% with neuropathy.    PAST MEDICAL HISTORY:  Past Medical History:   Diagnosis Date    BPH (benign prostatic hyperplasia)     CPAP (continuous positive airway pressure) dependence     Diabetes mellitus (Multi)     Diplopia 05/11/2016    Binocular vision disorder with diplopia    Diplopia 05/10/2016    Binocular vision disorder with diplopia    Diplopia 05/10/2016    Binocular vision disorder with diplopia    Diplopia 05/10/2016    Binocular vision disorder with diplopia    Diplopia 05/10/2016    Binocular vision disorder with diplopia    Hesitancy of micturition 09/16/2015    Hesitancy of micturition    Hyperlipidemia     Hypertension     Personal history of other endocrine, nutritional and metabolic disease     History of type 2 diabetes mellitus    Personal history of other mental and behavioral disorders 03/24/2014    History of anxiety disorder    Prostate cancer (Multi)     Sleep apnea     Urinary tract infection, site not specified 10/28/2019    Acute UTI (urinary tract infection)    Vitamin D deficiency, unspecified 06/04/2014    Vitamin D deficiency     PAST SURGICAL HISTORY:  Past Surgical History:   Procedure Laterality Date    ADENOIDECTOMY      BARIATRIC SURGERY  04/16/2014    KIDNEY STONE SURGERY      KNEE SURGERY  08/18/2014    Knee Surgery    OTHER SURGICAL HISTORY  06/09/2021    Cataract surgery    OTHER SURGICAL HISTORY  04/18/2022    Gastric Surgery For Morbid Obesity Laparoscopic Longitudinal Gastrectomy     ALLERGIES:  Allergies   Allergen Reactions    Morphine Hallucinations    Pollen Extracts Unknown     MEDICATIONS:    Current Outpatient Medications:     cholecalciferol (Vitamin D-3) 25 MCG (1000 UT) tablet, Take 1 tablet (25 mcg) by mouth 2 times a week., Disp: , Rfl:     cyanocobalamin, vitamin B-12, 1,000 mcg tablet,  sublingual, Place 1 tablet (1,000 mcg) under the tongue 2 times a week., Disp: , Rfl:     diphenhydrAMINE (BENADryl) 25 mg capsule, , Disp: , Rfl:     ferrous sulfate 325 (65 Fe) MG EC tablet, Take 1 tablet (65 mg) by mouth 2 times a week., Disp: , Rfl:     flash glucose sensor (FREESTYLE MELVIN 2 SENSOR MISC), Apply 1 sensor every 14 days to the back of upper arm, Disp: , Rfl:     FLUoxetine (PROzac) 20 mg capsule, TAKE 1 CAPSULE BY MOUTH EVERY OTHER DAY, Disp: 45 capsule, Rfl: 3    FreeStyle Melvin 2 [place] misc, Use as instructed, Disp: 1 each, Rfl: 0    FreeStyle Melvin 2 Sensor kit, APPLY 1 SENSOR EVERY 14 DAYS TO THE BACK OF UPPER ARM., Disp: , Rfl:     gabapentin (Neurontin) 100 mg capsule, TAKE AN ADDITIONAL 100MG CAPSULE AT HS FOR NEUROPATHIC SYMPTOMS. INCREASE DOSE IN 100MG INCREMENTS IN 2 WEEK INTERVALS UP TO AN EXTRA 300MG AT HS., Disp: 90 capsule, Rfl: 2    gabapentin (Neurontin) 300 mg capsule, Take 1 capsule (300 mg) by mouth 2 times a day., Disp: 180 capsule, Rfl: 0    Jardiance 25 mg, TAKE 1 TABLET ONCE DAILY, Disp: 90 tablet, Rfl: 3    metFORMIN (Glucophage) 1,000 mg tablet, TAKE 1 TABLET BY MOUTH TWICE A DAY WITH MEALS (Patient taking differently: Take 1 tablet (1,000 mg) by mouth 2 times daily (morning and late afternoon). Last dose 2-3-24), Disp: 180 tablet, Rfl: 3    MULTIVITAMIN ORAL, Take 1 tablet by mouth 1 (one) time each day., Disp: , Rfl:     omeprazole (PriLOSEC) 20 mg DR capsule, Take 1 capsule (20 mg) by mouth once daily in the morning. Take before meals., Disp: , Rfl:     Ozempic 1 mg/dose (4 mg/3 mL) pen injector, INJECT 1MG SUBCUTANEOUSLY  ONCE A WEEK (Patient taking differently: Inject 1 mg under the skin 1 (one) time per week. SUNDAY), Disp: 9 mL, Rfl: 3    ramipril (Altace) 5 mg capsule, TAKE 1 CAPSULE BY MOUTH ONCE DAILY, Disp: 90 capsule, Rfl: 3    simvastatin (Zocor) 20 mg tablet, TAKE 1 TABLET BY MOUTH EVERY EVENING, Disp: 90 tablet, Rfl: 3    sildenafil (Viagra) 50 mg tablet,  Take 1-2 tablets ( mg) by mouth. 30 minutes before sexual activity, Disp: , Rfl:    SOCIAL HISTORY:  Social History     Tobacco Use    Smoking status: Former     Current packs/day: 0.00     Average packs/day: 0.5 packs/day for 5.0 years (2.5 ttl pk-yrs)     Types: Cigarettes     Start date: 1963     Quit date:      Years since quittin.5     Passive exposure: Past    Smokeless tobacco: Never   Substance Use Topics    Alcohol use: Never     FAMILY HISTORY:  Family History   Problem Relation Name Age of Onset    Lymphoma Mother Andra Gongora         non-hodgkins    Cancer Mother Andra Gongora     Diabetes Father       REVIEW OF SYSTEMS:  Please refer to RN note.    PHYSICAL EXAMINATION:  Exam limited by telemedicine.  Patient is alert and in no acute distress. Normal work of breathing. Cranial nerves grossly intact.     CTCAE (v5) ADVERSE EVENTS:  Toxicity Assessment          2024    11:00   Toxicity Assessment   Treatment Site Pelvis - male   Diarrhea Grade 0   Urinary Incontinence Grade 0   Erectile Dysfunction Grade 2   Urinary Urgency Grade 0     PERFORMANCE STATUS:  KPS/ECO, Fully active, able to carry on all pre-disease performed without restriction (ECOG equivalent 0)    LABORATORY AND IMAGING DATA:  Imaging: All imaging was personally reviewed and interpreted in clinic. Findings as per HPI and EMR.    Laboratory/Pathology:  All pertinent labs and pathology were personally reviewed and interpreted in clinic. Findings as per HPI and EMR.  Lab Results   Component Value Date    PSA 8.30 (H) 2024    PSASCREEN 7.85 (H) 2023     Lab Results   Component Value Date    TESTOSTERONE 422 2022       IMPRESSION:  79 year old presenting with newly diagnosed prostate adenocarcinoma, iPSA 7.85, GG2 (noted in 1 of 4 systematic cores, so <50% cores), and 1/1 targeted core+).     He falls into intermediate favorable risk prostate cancer based on his clinical-pathologic  factors. He is not in any pain.    I discussed active surveillance versus definitive management. I discussed that AS involves a secondary confirmatory biopsy, followed by routine PSA check, with surveillance imaging and repeat biopsies no more than once a year or if concern for progression by PSA.      He is a candidate for definitive management, including either surgery or radiation. I explained the logistics of radiation therapy, including CT simulation, the use of daily image guidance to improve prostate localization, and the recommendation of fiducials and spacer gel placement to reduce potential toxic rectal toxicities.  Potential acute and long-term side effects of radiation were discussed, including acute and late side effects, including toxicities to bladder, rectum, bowel, urethra, erectile dysfunction, infertility and risk of secondary malignancy.  I discussed various EBRT treatment regimens, including moderate hypofractionation over 20 treatments, and ultra hypofractionation over 5 treatments. His IPSS is 0.     He is in agreement with this plan. We will let him know when the Decipher score results.    PLAN:  -send Decipher, will call with results    Thank you for the opportunity to participate in the care of this pleasant gentleman.    Lauren Peacock MD PhD  , Radiation Oncology

## 2024-07-05 DIAGNOSIS — E11.42 TYPE 2 DIABETES MELLITUS WITH DIABETIC POLYNEUROPATHY, WITHOUT LONG-TERM CURRENT USE OF INSULIN (MULTI): ICD-10-CM

## 2024-07-06 RX ORDER — FLASH GLUCOSE SCANNING READER
EACH MISCELLANEOUS
Qty: 1 EACH | Refills: 3 | Status: SHIPPED | OUTPATIENT
Start: 2024-07-06

## 2024-08-06 LAB
AP SUMMARY REPORT: NORMAL
SCAN RESULT: NORMAL

## 2024-08-23 DIAGNOSIS — E11.40 TYPE 2 DIABETES MELLITUS WITH DIABETIC NEUROPATHY, WITHOUT LONG-TERM CURRENT USE OF INSULIN (MULTI): ICD-10-CM

## 2024-08-26 ENCOUNTER — TELEPHONE (OUTPATIENT)
Dept: UROLOGY | Facility: CLINIC | Age: 79
End: 2024-08-26
Payer: MEDICARE

## 2024-08-26 RX ORDER — GABAPENTIN 300 MG/1
300 CAPSULE ORAL 2 TIMES DAILY
Qty: 180 CAPSULE | Refills: 0 | Status: SHIPPED | OUTPATIENT
Start: 2024-08-26

## 2024-08-26 NOTE — TELEPHONE ENCOUNTER
Left voicemail with office number [phone] schedule space[facility] for early 2025 per Dr. Peacock recommendation. Waiting for call back

## 2024-08-29 DIAGNOSIS — C61 PROSTATE CANCER (MULTI): ICD-10-CM

## 2024-08-29 RX ORDER — SODIUM CHLORIDE, SODIUM LACTATE, POTASSIUM CHLORIDE, CALCIUM CHLORIDE 600; 310; 30; 20 MG/100ML; MG/100ML; MG/100ML; MG/100ML
20 INJECTION, SOLUTION INTRAVENOUS CONTINUOUS
Status: CANCELLED | OUTPATIENT
Start: 2024-08-29

## 2024-09-03 RX ORDER — CEFAZOLIN SODIUM 2 G/100ML
2 INJECTION, SOLUTION INTRAVENOUS ONCE
OUTPATIENT
Start: 2024-09-03 | End: 2024-09-03

## 2024-09-14 DIAGNOSIS — E11.40 TYPE 2 DIABETES MELLITUS WITH DIABETIC NEUROPATHY, WITHOUT LONG-TERM CURRENT USE OF INSULIN: ICD-10-CM

## 2024-09-16 RX ORDER — GABAPENTIN 300 MG/1
300 CAPSULE ORAL 2 TIMES DAILY
Qty: 180 CAPSULE | Refills: 0 | Status: SHIPPED | OUTPATIENT
Start: 2024-09-16

## 2024-09-26 ENCOUNTER — APPOINTMENT (OUTPATIENT)
Dept: OPHTHALMOLOGY | Facility: CLINIC | Age: 79
End: 2024-09-26
Payer: MEDICARE

## 2024-09-26 DIAGNOSIS — E11.3299 DIABETIC RETINOPATHY, BACKGROUND (MULTI): Primary | ICD-10-CM

## 2024-09-26 PROCEDURE — 99213 OFFICE O/P EST LOW 20 MIN: CPT | Performed by: OPHTHALMOLOGY

## 2024-09-26 PROCEDURE — 92134 CPTRZ OPH DX IMG PST SGM RTA: CPT | Performed by: OPHTHALMOLOGY

## 2024-09-26 ASSESSMENT — SLIT LAMP EXAM - LIDS
COMMENTS: GOOD POSITION
COMMENTS: GOOD POSITION

## 2024-09-26 ASSESSMENT — VISUAL ACUITY
METHOD: SNELLEN - LINEAR
CORRECTION_TYPE: GLASSES
OS_PH_CC: 20/30
OD_CC: 20/25
OS_CC: 20/70

## 2024-09-26 ASSESSMENT — TONOMETRY
OS_IOP_MMHG: 13
IOP_METHOD: GOLDMANN APPLANATION
OD_IOP_MMHG: 17

## 2024-09-26 ASSESSMENT — EXTERNAL EXAM - LEFT EYE: OS_EXAM: NORMAL

## 2024-09-26 ASSESSMENT — CONF VISUAL FIELD
OS_SUPERIOR_NASAL_RESTRICTION: 0
OD_NORMAL: 1
OD_SUPERIOR_NASAL_RESTRICTION: 0
OD_INFERIOR_TEMPORAL_RESTRICTION: 0
OD_INFERIOR_NASAL_RESTRICTION: 0
OS_INFERIOR_NASAL_RESTRICTION: 0
OD_SUPERIOR_TEMPORAL_RESTRICTION: 0
OS_INFERIOR_TEMPORAL_RESTRICTION: 0
OS_SUPERIOR_TEMPORAL_RESTRICTION: 0
METHOD: COUNTING FINGERS
OS_NORMAL: 1

## 2024-09-26 ASSESSMENT — ENCOUNTER SYMPTOMS
ENDOCRINE NEGATIVE: 0
MUSCULOSKELETAL NEGATIVE: 0
PSYCHIATRIC NEGATIVE: 0
GASTROINTESTINAL NEGATIVE: 0
NEUROLOGICAL NEGATIVE: 0
HEMATOLOGIC/LYMPHATIC NEGATIVE: 0
CARDIOVASCULAR NEGATIVE: 0
EYES NEGATIVE: 0
RESPIRATORY NEGATIVE: 0
CONSTITUTIONAL NEGATIVE: 0
ALLERGIC/IMMUNOLOGIC NEGATIVE: 0

## 2024-09-26 ASSESSMENT — CUP TO DISC RATIO
OS_RATIO: .3
OD_RATIO: .3

## 2024-09-26 ASSESSMENT — EXTERNAL EXAM - RIGHT EYE: OD_EXAM: NORMAL

## 2024-09-26 NOTE — PROGRESS NOTES
Assessment/Plan   Diagnoses and all orders for this visit:  Diabetic retinopathy, background (Multi)  -     OCT, Retina - OU - Both Eyes  mild nonproliferative diabetic retinopathy without macular edema observed on exam today  exam today OU , pt ed to continue good BGlc, blood pressure and lipid control, rtc with any changes in vision,   discussed diabetic retinopathy  repeat dilated exam OCT mac 6 mo

## 2024-10-11 DIAGNOSIS — E11.42 TYPE 2 DIABETES MELLITUS WITH DIABETIC POLYNEUROPATHY, WITHOUT LONG-TERM CURRENT USE OF INSULIN: ICD-10-CM

## 2024-10-14 RX ORDER — SEMAGLUTIDE 1.34 MG/ML
1 INJECTION, SOLUTION SUBCUTANEOUS
Qty: 9 ML | Refills: 3 | Status: SHIPPED | OUTPATIENT
Start: 2024-10-20

## 2024-10-23 DIAGNOSIS — F41.9 ANXIETY: ICD-10-CM

## 2024-10-23 RX ORDER — FLUOXETINE HYDROCHLORIDE 20 MG/1
20 CAPSULE ORAL EVERY OTHER DAY
Qty: 45 CAPSULE | Refills: 3 | Status: SHIPPED | OUTPATIENT
Start: 2024-10-23

## 2024-11-04 DIAGNOSIS — E11.40 TYPE 2 DIABETES MELLITUS WITH DIABETIC NEUROPATHY, WITHOUT LONG-TERM CURRENT USE OF INSULIN: Primary | ICD-10-CM

## 2024-11-05 ENCOUNTER — APPOINTMENT (OUTPATIENT)
Dept: PRIMARY CARE | Facility: CLINIC | Age: 79
End: 2024-11-05
Payer: MEDICARE

## 2024-11-05 VITALS
BODY MASS INDEX: 27.26 KG/M2 | DIASTOLIC BLOOD PRESSURE: 75 MMHG | SYSTOLIC BLOOD PRESSURE: 120 MMHG | WEIGHT: 163.8 LBS | HEART RATE: 81 BPM | TEMPERATURE: 97.9 F

## 2024-11-05 DIAGNOSIS — N52.9 ERECTILE DYSFUNCTION, UNSPECIFIED ERECTILE DYSFUNCTION TYPE: ICD-10-CM

## 2024-11-05 DIAGNOSIS — C61 PROSTATE CANCER (MULTI): ICD-10-CM

## 2024-11-05 DIAGNOSIS — I10 BENIGN ESSENTIAL HYPERTENSION: Primary | ICD-10-CM

## 2024-11-05 DIAGNOSIS — E11.42 TYPE 2 DIABETES MELLITUS WITH DIABETIC POLYNEUROPATHY, WITHOUT LONG-TERM CURRENT USE OF INSULIN: ICD-10-CM

## 2024-11-05 PROCEDURE — 3078F DIAST BP <80 MM HG: CPT | Performed by: INTERNAL MEDICINE

## 2024-11-05 PROCEDURE — 3074F SYST BP LT 130 MM HG: CPT | Performed by: INTERNAL MEDICINE

## 2024-11-05 PROCEDURE — 99214 OFFICE O/P EST MOD 30 MIN: CPT | Performed by: INTERNAL MEDICINE

## 2024-11-05 PROCEDURE — 1126F AMNT PAIN NOTED NONE PRSNT: CPT | Performed by: INTERNAL MEDICINE

## 2024-11-05 PROCEDURE — 1036F TOBACCO NON-USER: CPT | Performed by: INTERNAL MEDICINE

## 2024-11-05 ASSESSMENT — PAIN SCALES - GENERAL: PAINLEVEL_OUTOF10: 0-NO PAIN

## 2024-11-05 NOTE — PROGRESS NOTES
Subjective   Patient ID: Boni Gongora is a 79 y.o. male who presents for Follow-up.  Boni has been well.  He is planning definitive XRT for his prostate cancer for early 2025.  He tells me that his glucose monitor suggests that his average glucose is 135.  He remains on Ozempic 1mg daily.  He is open to switching to Agustin 3.    He notes erectile dysfunction since he has started Ozempic. His bowels are now once every 5 or 6 days and not formed.  He wonders if this might be an Ozempic side effect. Sildenafil was ineffective. He has declined Dr. Gordon's offer of referral to one of his colleagues.    He did accidentally cut his toe when clipping his nails.      Current Outpatient Medications   Medication Instructions    cholecalciferol (VITAMIN D-3) 25 mcg, oral, 2 times weekly    cyanocobalamin, vitamin B-12, 1,000 mcg tablet, sublingual 1 tablet, sublingual, 2 times weekly    diphenhydrAMINE (BENADryl) 25 mg capsule No dose, route, or frequency recorded.    ferrous sulfate 65 mg, oral, 2 times weekly    flash glucose sensor (FREESTYLE AGUSTIN 2 SENSOR MISC) Apply 1 sensor every 14 days to the back of upper arm    FLUoxetine (PROZAC) 20 mg, oral, Every other day    FreeStyle Agustin 2 [place] misc USE AS INSTRUCTED    FreeStyle Agustin 2 Sensor kit APPLY 1 SENSOR EVERY 14 DAYS TO THE BACK OF UPPER ARM.    gabapentin (Neurontin) 100 mg capsule TAKE AN ADDITIONAL 100MG CAPSULE AT HS FOR NEUROPATHIC SYMPTOMS. INCREASE DOSE IN 100MG INCREMENTS IN 2 WEEK INTERVALS UP TO AN EXTRA 300MG AT HS.    gabapentin (NEURONTIN) 300 mg, oral, 2 times daily    Jardiance 25 mg, oral, Daily    metFORMIN (GLUCOPHAGE) 1,000 mg, oral, 2 times daily (morning and late afternoon)    MULTIVITAMIN ORAL 1 tablet, oral, Daily    omeprazole (PRILOSEC) 20 mg, oral, Daily before breakfast    Ozempic 1 mg, subcutaneous, Once Weekly, SUNDAY    ramipril (ALTACE) 5 mg, oral, Daily    sildenafil (Viagra) 50 mg tablet 1-2 tablets, oral, 30 minutes before  sexual activity    simvastatin (ZOCOR) 20 mg, oral, Nightly     Review of Systems  Denies CP, SOB, edema.  Objective   /75 (BP Location: Right arm, Patient Position: Sitting, BP Cuff Size: Adult)   Pulse 81   Temp 36.6 °C (97.9 °F) (Temporal)   Wt 74.3 kg (163 lb 12.8 oz)   BMI 27.26 kg/m²   Physical Exam  Lungs CTAP  COR RRR s MRG  No edema  There is a healing wound on his L big toe. No erythema.  Reduced sensation to filament on both feet.    Assessment/Plan   Problem List Items Addressed This Visit             ICD-10-CM    Benign essential hypertension - Primary I10    Diabetes mellitus (Multi) E11.9     Will assess control.         Erectile dysfunction N52.9    Prostate cancer (Multi) C61     Planning definitive XRT.

## 2024-11-15 DIAGNOSIS — E11.42 TYPE 2 DIABETES MELLITUS WITH DIABETIC POLYNEUROPATHY, WITHOUT LONG-TERM CURRENT USE OF INSULIN: ICD-10-CM

## 2024-11-15 DIAGNOSIS — I10 BENIGN ESSENTIAL HYPERTENSION: ICD-10-CM

## 2024-11-18 RX ORDER — METFORMIN HYDROCHLORIDE 1000 MG/1
1000 TABLET ORAL
Qty: 180 TABLET | Refills: 3 | Status: SHIPPED | OUTPATIENT
Start: 2024-11-18

## 2024-11-18 RX ORDER — SIMVASTATIN 20 MG/1
20 TABLET, FILM COATED ORAL EVERY EVENING
Qty: 90 TABLET | Refills: 3 | Status: SHIPPED | OUTPATIENT
Start: 2024-11-18

## 2024-11-18 RX ORDER — RAMIPRIL 5 MG/1
5 CAPSULE ORAL DAILY
Qty: 90 CAPSULE | Refills: 3 | Status: SHIPPED | OUTPATIENT
Start: 2024-11-18

## 2024-12-10 ENCOUNTER — LAB (OUTPATIENT)
Dept: LAB | Facility: LAB | Age: 79
End: 2024-12-10
Payer: MEDICARE

## 2024-12-10 DIAGNOSIS — E11.40 TYPE 2 DIABETES MELLITUS WITH DIABETIC NEUROPATHY, WITHOUT LONG-TERM CURRENT USE OF INSULIN: ICD-10-CM

## 2024-12-10 DIAGNOSIS — C61 PROSTATE CANCER (MULTI): ICD-10-CM

## 2024-12-10 LAB
BUN SERPL-MCNC: 14 MG/DL (ref 6–23)
CREAT SERPL-MCNC: 0.87 MG/DL (ref 0.5–1.3)
EGFRCR SERPLBLD CKD-EPI 2021: 88 ML/MIN/1.73M*2
EST. AVERAGE GLUCOSE BLD GHB EST-MCNC: 128 MG/DL
HBA1C MFR BLD: 6.1 %
PSA SERPL-MCNC: 9.02 NG/ML

## 2024-12-10 PROCEDURE — 36415 COLL VENOUS BLD VENIPUNCTURE: CPT

## 2024-12-10 PROCEDURE — 84520 ASSAY OF UREA NITROGEN: CPT

## 2024-12-10 PROCEDURE — 82565 ASSAY OF CREATININE: CPT

## 2024-12-10 PROCEDURE — 83036 HEMOGLOBIN GLYCOSYLATED A1C: CPT

## 2024-12-10 PROCEDURE — 84153 ASSAY OF PSA TOTAL: CPT

## 2024-12-13 NOTE — RESULT ENCOUNTER NOTE
Dear Patient,    GOOD NEWS    Attached to this note is a copy of the testing that I have ordered.The results indicate that there are no significant abnormalities in your results, even if some of the findings are reported as abnormal.    Please continue your current treatment plan as we have discussed and return for follow up as planned.    Do not hesitate to contact me if you have any questions or concerns.    Sincerely,  Darrick Morataya M.D.

## 2024-12-19 ENCOUNTER — APPOINTMENT (OUTPATIENT)
Dept: UROLOGY | Facility: CLINIC | Age: 79
End: 2024-12-19
Payer: MEDICARE

## 2024-12-19 VITALS — WEIGHT: 163 LBS | TEMPERATURE: 97 F | HEIGHT: 69 IN | BODY MASS INDEX: 24.14 KG/M2

## 2024-12-19 DIAGNOSIS — C61 PROSTATE CANCER (MULTI): Primary | ICD-10-CM

## 2024-12-19 PROCEDURE — 1126F AMNT PAIN NOTED NONE PRSNT: CPT | Performed by: STUDENT IN AN ORGANIZED HEALTH CARE EDUCATION/TRAINING PROGRAM

## 2024-12-19 PROCEDURE — 1159F MED LIST DOCD IN RCRD: CPT | Performed by: STUDENT IN AN ORGANIZED HEALTH CARE EDUCATION/TRAINING PROGRAM

## 2024-12-19 PROCEDURE — G2211 COMPLEX E/M VISIT ADD ON: HCPCS | Performed by: STUDENT IN AN ORGANIZED HEALTH CARE EDUCATION/TRAINING PROGRAM

## 2024-12-19 PROCEDURE — 99214 OFFICE O/P EST MOD 30 MIN: CPT | Performed by: STUDENT IN AN ORGANIZED HEALTH CARE EDUCATION/TRAINING PROGRAM

## 2024-12-19 ASSESSMENT — PAIN SCALES - GENERAL: PAINLEVEL_OUTOF10: 0-NO PAIN

## 2024-12-19 NOTE — PROGRESS NOTES
HPI:  Proc (2/6/24): TP prostate biopsy   Path: prostatic adenocarcinoma, MARLA #1 GG2 (Kipton score 3+4=7), 5/6 cores (90% of tissue), pattern 4 (10%), acinar type, ASAP    Boni Gongora is a 79 y.o. male referred by Dr. Morataya for elevated PSA. Hx of ED, kidney stones, HTN, DM2, GERD, HEMANT. MRI Prostate (11/7/23) showed 34g, PI-RADS 4 lesion within the left posteromedial PZ at the basilar gland, lesion abuts the capsule and the medial aspect of the left seminal vesicle raising concern for extracapsular extension, few nonenlarged mesorectal lymph nodes which are indeterminate, changes associated with BPH including nodules (PI-RADS 2). S/p TP prostate biopsy (2/6/24) with pathology showing prostatic adenocarcinoma, MARLA #1 GG2 (Camille score 3+4=7), 5/6 cores (90% of tissue), pattern 4 (10%), acinar type, ASAP. Patient saw Dr. Peacock, radiation oncology (7/2/24), plan for spaceOAR fiducials and SBRT in February 2025. Reports loose stools since starting Ozempic.     PSA: 9.02 (12/10/24), 8.30 (6/12/24), 7.85 (9/5/23)    MRI Prostate (11/7/23): 34g, PI-RADS 4 lesion within the left posteromedial PZ at the basilar gland, lesion abuts the capsule and the medial aspect of the left seminal vesicle raising concern for extracapsular extension, few nonenlarged mesorectal lymph nodes which are indeterminate, changes associated with BPH including nodules (PI-RADS 2).    Review of Systems:  All systems reviewed. Anything negative noted in the HPI.    Physical Exam:  Vitals signs reviewed.  Constitutional:      Appearance: Well-developed.  HENT:     Head: Normocephalic and atraumatic.  Neck:     Musculoskeletal: Normal range of motion.  Pulmonary:     Effort: Pulmonary effort is normal.  Musculoskeletal: Normal range of motion.  Skin:     General: Skin is warm and dry.  Neurological:     Mental Status: Alert and oriented to person, place, and time.  Psychiatric:        Behavior: Behavior normal.        Thought Content: Thought  content normal.        Judgment: Judgment normal.    Assessment/Plan   Boni Gongora is a 79 y.o. male referred by Dr. Morataya for elevated PSA. Hx of ED, kidney stones, HTN, DM2, GERD, HEMANT. MRI Prostate (11/7/23) showed 34g, PI-RADS 4 lesion within the left posteromedial PZ at the basilar gland, lesion abuts the capsule and the medial aspect of the left seminal vesicle raising concern for extracapsular extension, few nonenlarged mesorectal lymph nodes which are indeterminate, changes associated with BPH including nodules (PI-RADS 2). S/p TP prostate biopsy (2/6/24) with pathology showing prostatic adenocarcinoma, MARLA #1 GG2 (Rochert score 3+4=7), 5/6 cores (90% of tissue), pattern 4 (10%), acinar type, ASAP. Patient saw Dr. Peacock, radiation oncology (7/2/24), plan for spaceOAR fiducials and SBRT in February 2025. Doing well. Management options including risks, benefits and alternatives discussed at length and all questions answered. Patient prefers to proceed with follow-up as needed.           Scribe Attestation  By signing my name below, IGinny Scribe   attest that this documentation has been prepared under the direction and in the presence of Jose David Gordon MD.

## 2025-01-21 ENCOUNTER — PRE-ADMISSION TESTING (OUTPATIENT)
Dept: PREADMISSION TESTING | Facility: HOSPITAL | Age: 80
End: 2025-01-21
Payer: MEDICARE

## 2025-01-21 VITALS
HEART RATE: 90 BPM | WEIGHT: 159.39 LBS | SYSTOLIC BLOOD PRESSURE: 107 MMHG | BODY MASS INDEX: 23.61 KG/M2 | OXYGEN SATURATION: 99 % | HEIGHT: 69 IN | RESPIRATION RATE: 16 BRPM | TEMPERATURE: 98.1 F | DIASTOLIC BLOOD PRESSURE: 79 MMHG

## 2025-01-21 DIAGNOSIS — C61 PROSTATE CANCER (MULTI): ICD-10-CM

## 2025-01-21 DIAGNOSIS — Z01.818 PREOP TESTING: Primary | ICD-10-CM

## 2025-01-21 DIAGNOSIS — R82.90 UNSPECIFIED ABNORMAL FINDINGS IN URINE: ICD-10-CM

## 2025-01-21 PROCEDURE — 99203 OFFICE O/P NEW LOW 30 MIN: CPT | Performed by: NURSE PRACTITIONER

## 2025-01-21 PROCEDURE — 93010 ELECTROCARDIOGRAM REPORT: CPT | Performed by: INTERNAL MEDICINE

## 2025-01-21 PROCEDURE — 93005 ELECTROCARDIOGRAM TRACING: CPT

## 2025-01-21 RX ORDER — METRONIDAZOLE 7.5 MG/G
GEL TOPICAL DAILY PRN
COMMUNITY
Start: 2024-09-14

## 2025-01-21 RX ORDER — ACETAMINOPHEN 500 MG
TABLET ORAL EVERY 6 HOURS PRN
COMMUNITY

## 2025-01-21 RX ORDER — ACETAMINOPHEN AND CODEINE PHOSPHATE 300; 30 MG/1; MG/1
2 TABLET ORAL 2 TIMES DAILY PRN
COMMUNITY

## 2025-01-21 ASSESSMENT — PAIN - FUNCTIONAL ASSESSMENT: PAIN_FUNCTIONAL_ASSESSMENT: 0-10

## 2025-01-21 ASSESSMENT — DUKE ACTIVITY SCORE INDEX (DASI)
CAN YOU DO MODERATE WORK AROUND THE HOUSE LIKE VACUUMING, SWEEPING FLOORS OR CARRYING GROCERIES: YES
CAN YOU RUN A SHORT DISTANCE: NO
TOTAL_SCORE: 23.45
CAN YOU PARTICIPATE IN MODERATE RECREATIONAL ACTIVITIES LIKE GOLF, BOWLING, DANCING, DOUBLES TENNIS OR THROWING A BASEBALL OR FOOTBALL: NO
CAN YOU DO YARD WORK LIKE RAKING LEAVES, WEEDING OR PUSHING A MOWER: YES
CAN YOU PARTICIPATE IN STRENOUS SPORTS LIKE SWIMMING, SINGLES TENNIS, FOOTBALL, BASKETBALL, OR SKIING: NO
CAN YOU HAVE SEXUAL RELATIONS: NO
CAN YOU CLIMB A FLIGHT OF STAIRS OR WALK UP A HILL: YES
CAN YOU TAKE CARE OF YOURSELF (EAT, DRESS, BATHE, OR USE TOILET): YES
CAN YOU DO HEAVY WORK AROUND THE HOUSE LIKE SCRUBBING FLOORS OR LIFTING AND MOVING HEAVY FURNITURE: NO
CAN YOU DO LIGHT WORK AROUND THE HOUSE LIKE DUSTING OR WASHING DISHES: YES
DASI METS SCORE: 5.6
CAN YOU WALK A BLOCK OR TWO ON LEVEL GROUND: YES
CAN YOU WALK INDOORS, SUCH AS AROUND YOUR HOUSE: YES

## 2025-01-21 ASSESSMENT — PAIN SCALES - GENERAL: PAINLEVEL_OUTOF10: 0 - NO PAIN

## 2025-01-21 NOTE — CPM/PAT H&P
CPM/PAT Evaluation       Name: Boni SALLIE Fransisca (Boni Gongora)  /Age: 1945/79 y.o.     In-Person       Chief Complaint: Prostate cancer (Multi)     HPI  Patient is an alert and oriented 79 year old male scheduled for a  Insertion Fiducial Marker Prostate on 25 with  Insertion Fiducial Marker Prostate for Prostate cancer (Multi). PMHX includes DM. Presents to [facility] PAT today for perioperative risk stratification and optimization.       Past Medical History:   Diagnosis Date    BPH (benign prostatic hyperplasia)     CPAP (continuous positive airway pressure) dependence     Diabetes mellitus (Multi)     Diplopia 2016    Binocular vision disorder with diplopia    Diplopia 05/10/2016    Binocular vision disorder with diplopia    Diplopia 05/10/2016    Binocular vision disorder with diplopia    Diplopia 05/10/2016    Binocular vision disorder with diplopia    Diplopia 05/10/2016    Binocular vision disorder with diplopia    Hesitancy of micturition 2015    Hesitancy of micturition    Hyperlipidemia     Hypertension     Personal history of other endocrine, nutritional and metabolic disease     History of type 2 diabetes mellitus    Personal history of other mental and behavioral disorders 2014    History of anxiety disorder    Prostate cancer (Multi)     Sleep apnea     Urinary tract infection, site not specified 10/28/2019    Acute UTI (urinary tract infection)    Vitamin D deficiency, unspecified 2014    Vitamin D deficiency       Past Surgical History:   Procedure Laterality Date    ADENOIDECTOMY      BARIATRIC SURGERY  2014    KIDNEY STONE SURGERY      KNEE SURGERY  2014    Knee Surgery    OTHER SURGICAL HISTORY  2021    Cataract surgery    OTHER SURGICAL HISTORY  2022    Gastric Surgery For Morbid Obesity Laparoscopic Longitudinal Gastrectomy       Patient  reports that he is not currently sexually active and has had partner(s) who are female. He  reports using the following methods of birth control/protection: Condom Male and Female Sterilization.    Family History   Problem Relation Name Age of Onset    Lymphoma Mother Andra Gongora         non-hodgkins    Cancer Mother Andra Gongora     Diabetes Father         Allergies   Allergen Reactions    Morphine Hallucinations    Pollen Extracts Unknown       Prior to Admission medications    Medication Sig Start Date End Date Taking? Authorizing Provider   cholecalciferol (Vitamin D-3) 25 MCG (1000 UT) tablet Take 1 tablet (25 mcg) by mouth 2 times a week. 5/25/16   Historical Provider, MD   cyanocobalamin, vitamin B-12, 1,000 mcg tablet, sublingual Place 1 tablet (1,000 mcg) under the tongue 2 times a week. 7/31/14   Historical Provider, MD   diphenhydrAMINE (BENADryl) 25 mg capsule     Historical Provider, MD   ferrous sulfate 325 (65 Fe) MG EC tablet Take 1 tablet (65 mg) by mouth 2 times a week. 10/21/15   Historical Provider, MD   flash glucose sensor (FREESTYLE AGUSTIN 2 SENSOR MISC) Apply 1 sensor every 14 days to the back of upper arm    Historical Provider, MD   FLUoxetine (PROzac) 20 mg capsule TAKE 1 CAPSULE BY MOUTH EVERY OTHER DAY 10/23/24   MD Nuha MoralesStyle Agustin 2 [place] misc USE AS INSTRUCTED 7/6/24   Darrick Morataya MD   FreeStyle Agustin 2 Sensor kit APPLY 1 SENSOR EVERY 14 DAYS TO THE BACK OF UPPER ARM. 3/3/23   Historical Provider, MD   gabapentin (Neurontin) 100 mg capsule TAKE AN ADDITIONAL 100MG CAPSULE AT HS FOR NEUROPATHIC SYMPTOMS. INCREASE DOSE IN 100MG INCREMENTS IN 2 WEEK INTERVALS UP TO AN EXTRA 300MG AT HS. 5/21/24   Darrick Morataya MD   gabapentin (Neurontin) 300 mg capsule Take 1 capsule (300 mg) by mouth 2 times a day. 9/16/24   Darrick Morataya MD   Jardiance 25 mg TAKE 1 TABLET ONCE DAILY 1/18/24   Darrick Morataya MD   metFORMIN (Glucophage) 1,000 mg tablet TAKE 1 TABLET BY MOUTH TWICE A DAY WITH FOOD 11/18/24   Darrick Morataya MD   MULTIVITAMIN ORAL Take 1  tablet by mouth 1 (one) time each day. 7/31/14   Historical Provider, MD   omeprazole (PriLOSEC) 20 mg DR capsule Take 1 capsule (20 mg) by mouth once daily in the morning. Take before meals. 10/26/20   Historical Provider, MD   ramipril (Altace) 5 mg capsule TAKE 1 CAPSULE BY MOUTH EVERY DAY 11/18/24   Darrick Morataya MD   semaglutide (Ozempic) 1 mg/dose (4 mg/3 mL) pen injector Inject 1 mg under the skin 1 (one) time per week. PAUL 10/20/24   Darrick Morataya MD   simvastatin (Zocor) 20 mg tablet TAKE 1 TABLET BY MOUTH EVERY DAY IN THE EVENING 11/18/24   Darrick Morataya MD        Review of Systems   Constitutional: Negative for chills, decreased appetite, diaphoresis, fever and malaise/fatigue.   Eyes:  Negative for blurred vision and double vision.   Cardiovascular:  Negative for chest pain, claudication, cyanosis, dyspnea on exertion, irregular heartbeat, leg swelling, near-syncope and palpitations.   Respiratory:  Negative for cough, hemoptysis, shortness of breath and wheezing.    Endocrine: Negative for cold intolerance, heat intolerance, polydipsia, polyphagia and polyuria.   Gastrointestinal:  Negative for abdominal pain, constipation, diarrhea, dysphagia, nausea and vomiting.   Genitourinary:  Negative for bladder incontinence, dysuria, hematuria, incomplete emptying, nocturia, frequency, pelvic pain and urgency.   Neurological:  Negative for headaches, light-headedness, paresthesias, sensory change and weakness.   Psychiatric/Behavioral:  Negative for altered mental status.    Musculoskeletal: Negative for myalgias, arthralgias     Vitals and nursing note reviewed.     Physical exam  Constitutional:       Appearance: Normal appearance. He is Normal.   HENT:      Head: Normocephalic and atraumatic.      Mouth/Throat:      Mouth: Mucous membranes are moist.      Pharynx: Oropharynx is clear.   Eyes:      Extraocular Movements: Extraocular movements intact.      Conjunctiva/sclera: Conjunctivae normal.       Pupils: Pupils are equal, round, and reactive to light.   Cardiovascular:      PMI at left midclavicular line. Normal rate. Regular rhythm. Normal S1. Normal S2.       Murmurs: There is no murmur.      No gallop.  No click. No rub.       No audible carotid bruit     No lower extremity edema on exam  Pulmonary:      Effort: Pulmonary effort is normal.      Breath sounds: Normal breath sounds.   Abdominal:      General: Abdomen is flat. Bowel sounds are normal.      Palpations: Abdomen is soft and non-tender  Musculoskeletal:      Cervical back: Normal range of motion and neck supple.   Skin:     General: Skin is warm and dry.      Capillary Refill: Capillary refill takes less than 2 seconds.   Neurological:      General: No focal deficit present.      Mental Status: He is alert and oriented to person, place, and time. Mental status is at baseline.   Psychiatric:         Mood and Affect: Mood normal.         Behavior: Behavior normal.         Thought Content: Thought content normal.         Judgment: Judgment normal.     Vitals and nursing note reviewed. Physical exam within normal limits.         DASI Risk Score      Flowsheet Row Pre-Admission Testing from 1/21/2025 in [facility] Questionnaire Series Submission from 12/29/2023 in [facility] with Generic Provider Deacon   Can you take care of yourself (eat, dress, bathe, or use toilet)?  2.75 filed at 01/21/2025 1506 2.75  filed at 12/29/2023 1529   Can you walk indoors, such as around your house? 1.75 filed at 01/21/2025 1506 1.75  filed at 12/29/2023 1529   Can you walk a block or two on level ground?  2.75 filed at 01/21/2025 1506 2.75  filed at 12/29/2023 1529   Can you climb a flight of stairs or walk up a hill? 5.5 filed at 01/21/2025 1506 5.5  filed at 12/29/2023 1529   Can you run a short distance? 0 filed at 01/21/2025 1506 0  filed at 12/29/2023 1529   Can you do light work around the house like dusting or washing dishes? 2.7 filed at  01/21/2025 1506 2.7  filed at 12/29/2023 1529   Can you do moderate work around the house like vacuuming, sweeping floors or carrying groceries? 3.5 filed at 01/21/2025 1506 3.5  filed at 12/29/2023 1529   Can you do heavy work around the house like scrubbing floors or lifting and moving heavy furniture?  0 filed at 01/21/2025 1506 0  filed at 12/29/2023 1529   Can you do yard work like raking leaves, weeding or pushing a mower? 4.5 filed at 01/21/2025 1506 4.5  filed at 12/29/2023 1529   Can you have sexual relations? 0 filed at 01/21/2025 1506 0  filed at 12/29/2023 1529   Can you participate in moderate recreational activities like golf, bowling, dancing, doubles tennis or throwing a baseball or football? 0 filed at 01/21/2025 1506 0  filed at 12/29/2023 1529   Can you participate in strenous sports like swimming, singles tennis, football, basketball, or skiing? 0 filed at 01/21/2025 1506 0  filed at 12/29/2023 1529   DASI SCORE 23.45 filed at 01/21/2025 1506 23.45 filed at 12/29/2023 1529   METS Score (Will be calculated only when all the questions are answered) 5.6 filed at 01/21/2025 1506 5.6 filed at 12/29/2023 1529          Capalexanderi DVT Assessment      Flowsheet Row Pre-Admission Testing from 1/21/2025 in [facility]   DVT Score (IF A SCORE IS NOT CALCULATING, MUST SELECT A BMI TO COMPLETE) 6 filed at 01/21/2025 1505   Surgical Factors Major surgery planned, including arthroscopic and laproscopic (1-2 hours) filed at 01/21/2025 1505   BMI (BMI MUST BE CHOSEN) 30 or less filed at 01/21/2025 1505          Modified Frailty Index    No data to display       CHADS2 Stroke Risk  Current as of just now        N/A 3 to 100%: High Risk   2 to < 3%: Medium Risk   0 to < 2%: Low Risk     Last Change: N/A          This score determines the patient's risk of having a stroke if the patient has atrial fibrillation.        This score is not applicable to this patient. Components are not calculated.           Revised Cardiac Risk Index      Flowsheet Row Pre-Admission Testing from 1/21/2025 in [facility]   High-Risk Surgery (Intraperitoneal, Intrathoracic,Suprainguinal vascular) 0 filed at 01/21/2025 1507   History of ischemic heart disease (History of MI, History of positive exercuse test, Current chest paint considered due to myocardial ischemia, Use of nitrate therapy, ECG with pathological Q Waves) 0 filed at 01/21/2025 1507   History of congestive heart failure (pulmonary edemia, bilateral rales or S3 gallop, Paroxysmal nocturnal dyspnea, CXR showing pulmonary vascular redistribution) 0 filed at 01/21/2025 1507   History of cerebrovascular disease (Prior TIA or stroke) 0 filed at 01/21/2025 1507   Pre-operative insulin treatment 0 filed at 01/21/2025 1507   Pre-operative creatinine>2 mg/dl 0 filed at 01/21/2025 1507   Revised Cardiac Risk Calculator 0 filed at 01/21/2025 1507          Apfel Simplified Score    No data to display       Risk Analysis Index Results This Encounter    No data found in the last 10 encounters.       Stop Bang Score      Flowsheet Row Pre-Admission Testing from 1/21/2025 in [facility] Admission (Discharged) from 2/6/2024 in [facility] OR with Jose David Gordon MD   Do you snore loudly? 0 filed at 01/21/2025 1412 1  [diagnosed sleep apnea, cpap dependence] filed at 02/06/2024 0636   Do you often feel tired or fatigued after your sleep? 0 filed at 01/21/2025 1412 1 filed at 02/06/2024 0636   Has anyone ever observed you stop breathing in your sleep? 1 filed at 01/21/2025 1412 1 filed at 02/06/2024 0636   Do you have or are you being treated for high blood pressure? 0 filed at 01/21/2025 1412 1 filed at 02/06/2024 0636   Recent BMI (Calculated) 23.5 filed at 01/21/2025 1412 23.9 filed at 02/06/2024 0636   Is BMI greater than 35 kg/m2? 0=No filed at 01/21/2025 1412 0=No filed at 02/06/2024 0636   Age older than 50 years old? 1=Yes filed at  01/21/2025 1412 1=Yes filed at 02/06/2024 0636   Is your neck circumference greater than 17 inches (Male) or 16 inches (Female)? 0 filed at 01/21/2025 1412 0 filed at 02/06/2024 0636   Gender - Male 1=Yes filed at 01/21/2025 1412 1=Yes filed at 02/06/2024 0636   STOP-BANG Total Score 3 filed at 01/21/2025 1412 6 filed at 02/06/2024 0636          Prodigy: High Risk  Total Score: 23              Prodigy Age Score      Prodigy Gender Score     Prodigy Previous Opioid Use Score           ARISCAT Score for Postoperative Pulmonary Complications    No data to display       Hien Perioperative Risk for Myocardial Infarction or Cardiac Arrest (REINALDO)      Flowsheet Row Pre-Admission Testing from 1/21/2025 in [facility]   Calculated Age Score 1.58 filed at 01/21/2025 1507   Functional Status  0 filed at 01/21/2025 1507   ASA Class  -1.92 filed at 01/21/2025 1507   Creatinine 0 filed at 01/21/2025 1507   Type of Procedure  -0.26 filed at 01/21/2025 1507   REINALDO Total Score  -5.85 filed at 01/21/2025 1507   REINALDO % 0.29 filed at 01/21/2025 1507            Assessment & Plan:    Neuro:  No diagnosis or significant findings on chart review or clinical presentation and evaluation.     HEENT/Airway:  Pt has HEMANT and uses his CPAP   STOP-BANG Score-3  points moderaterisk for HEMANT    Mallampati::  II    TM distance::  >3 FB    Neck ROM::  Full  Dentures-denies  Crowns-denies  Implants-denies    Cardiovascular:  HLD (simvastatin)   METS: 5.6  RCRI: 0 points, 3.9%  risk for postoperative MACE   REINALDO: 0.29% risk for postoperative MACE  EKG -normal sinus rhythm Rate- No acute changes.     Pulmonary:  No diagnosis or significant findings on chart review or clinical presentation and evaluation.   ARISCAT: <26 points, 1.6% risk of in-hospital postoperative pulmonary complication  PRODIGY: Low risk for opioid induced respiratory depression  Smoking History-He has never smoked.  Deep breathing handout given    Renal/Urinary:    ED  (sildenafil)  Prostate cancer   the patient is at increased risk of perioperative renal complications secondary to diabetes. Preventative measures include BP monitoring, medication compliance, and hydration management.   CMP-Pending  Creatinine-  GFR-    Endocrine:  Diabetes (ozempic, jardiance, metformin)    [id number]-6.1    Hematologic/Immunology:  No diagnosis or significant findings on chart review or clinical presentation and evaluation.  The patient is not a Jehovah’s witness and will accept blood and blood products if medically indicated.   History of previous blood transfusions No  CBC-Pending  HGB-Pending  Caprini Score 6, patient at Moderate for postoperative DVT. Pt supplied education/VTE handout  Anticoagulation use: No     Gastrointestinal:   GERD (omeprazole)   Recreational drug use: none  Alcohol use none, he has wine on passover     Infectious disease:   No diagnosis or significant findings on chart review or clinical presentation and evaluation.     Musculoskeletal:   No diagnosis or significant findings on chart review or clinical presentation and evaluation.   JHFRAT score-7  points. moderate risk for falls    Anesthesia:  ASA 3 - Patient with moderate systemic disease with functional limitations  Anticipated anesthesia-General  History of General anesthesia- yes  Complications- No anesthesia complications  No family history of anesthesia complications    Abnormalities noted on PAT evaluation: No    Labs & Imaging ordered:  UA, CBC, CMP, EKG  Nickel/metal allergy-negative  Shellfish allergy-negative     Discussed with patient medication instructions, NPO guidelines, and any questions or concerns.      time spent  35+

## 2025-01-21 NOTE — PREPROCEDURE INSTRUCTIONS
Medication List            Accurate as of January 21, 2025  2:14 PM. Always use your most recent med list.                acetaminophen 500 mg tablet  Commonly known as: Tylenol  Medication Adjustments for Surgery: Take/Use as prescribed     acetaminophen-codeine 300-30 mg tablet  Commonly known as: Tylenol w/ Codeine #3  Medication Adjustments for Surgery: Take/Use as prescribed     cholecalciferol 25 MCG (1000 UT) tablet  Commonly known as: Vitamin D-3  Additional Medication Adjustments for Surgery: Take last dose 7 days before surgery  Notes to patient: Last dose preoperatively on 2/3/25     cyanocobalamin (vitamin B-12) 1,000 mcg tablet, sublingual  Additional Medication Adjustments for Surgery: Take last dose 7 days before surgery  Notes to patient: Last dose preoperatively on 2/3/25     diphenhydrAMINE 25 mg capsule  Commonly known as: BENADryl  Medication Adjustments for Surgery: Do Not take on the morning of surgery     diphenhydrAMINE-acetaminophen  mg per tablet  Commonly known as: Tylenol PM  Medication Adjustments for Surgery: Do Not take on the morning of surgery     ferrous sulfate 325 (65 Fe) MG EC tablet  Medication Adjustments for Surgery: Do Not take on the morning of surgery     FLUoxetine 20 mg capsule  Commonly known as: PROzac  TAKE 1 CAPSULE BY MOUTH EVERY OTHER DAY  Medication Adjustments for Surgery: Take/Use as prescribed     FreeStyle Agustin 2 [place] misc  Generic drug: flash glucose scanning reader  USE AS INSTRUCTED  Medication Adjustments for Surgery: Take/Use as prescribed     * FREESTYLE AGUSTIN 2 SENSOR MISC  Medication Adjustments for Surgery: Take/Use as prescribed     * FreeStyle Agustin 2 Sensor kit  Generic drug: flash glucose sensor kit  Medication Adjustments for Surgery: Take/Use as prescribed     * gabapentin 100 mg capsule  Commonly known as: Neurontin  TAKE AN ADDITIONAL 100MG CAPSULE AT  FOR NEUROPATHIC SYMPTOMS. INCREASE DOSE IN 100MG INCREMENTS IN 2 WEEK INTERVALS  UP TO AN EXTRA 300MG AT HS.  Medication Adjustments for Surgery: Take/Use as prescribed     * gabapentin 300 mg capsule  Commonly known as: Neurontin  Take 1 capsule (300 mg) by mouth 2 times a day.  Medication Adjustments for Surgery: Take/Use as prescribed     Jardiance 25 mg  Generic drug: empagliflozin  TAKE 1 TABLET ONCE DAILY  Medication Adjustments for Surgery: Take last dose 3 days before surgery  Notes to patient: Last dose preoperatively on 2/7/25     metFORMIN 1,000 mg tablet  Commonly known as: Glucophage  TAKE 1 TABLET BY MOUTH TWICE A DAY WITH FOOD  Medication Adjustments for Surgery: Do Not take on the morning of surgery     metroNIDAZOLE 0.75 % gel  Commonly known as: Metrogel  Medication Adjustments for Surgery: Do Not take on the morning of surgery     MULTIVITAMIN ORAL  Additional Medication Adjustments for Surgery: Take last dose 7 days before surgery  Notes to patient: Last dose preoperatively on 2/3/25     omeprazole 20 mg DR capsule  Commonly known as: PriLOSEC  Medication Adjustments for Surgery: Take/Use as prescribed     Ozempic 1 mg/dose (4 mg/3 mL) pen injector  Generic drug: semaglutide  Inject 1 mg under the skin 1 (one) time per week. SUNDAY  Additional Medication Adjustments for Surgery: Take last dose 7 days before surgery  Notes to patient: Last dose preoperatively on 2/3/25     ramipril 5 mg capsule  Commonly known as: Altace  TAKE 1 CAPSULE BY MOUTH EVERY DAY  Medication Adjustments for Surgery: Take last dose 1 day (24 hours) before surgery  Notes to patient: Last dose preoperatively it taken in the mornings on 2/10/25, if taken in the evening on 2/9/25     simvastatin 20 mg tablet  Commonly known as: Zocor  TAKE 1 TABLET BY MOUTH EVERY DAY IN THE EVENING  Medication Adjustments for Surgery: Take/Use as prescribed           * This list has 4 medication(s) that are the same as other medications prescribed for you. Read the directions carefully, and ask your doctor or other care  provider to review them with you.                NPO Instructions:     Do not eat any food or drink liquid after midnight the night before your surgery/procedure.  Additional Instructions:      Seven/Six Days before Surgery:  Review your medication instructions, stop indicated medications  Five Days before Surgery:  Review your medication instructions, stop indicated medications  Three Days before Surgery:  Review your medication instructions, stop indicated medications  The Day before Surgery:  No smoking or alcohol use 24 hours before surgery  Review your medication instructions, stop indicated medications  You will be contacted regarding the time of your arrival to facility and surgery time  Do not eat any food after Midnight  Day of Surgery:  Review your medication instructions, take indicated medications  If you have diabetes, please check your fasting blood sugar upon awakening.  If fasting blood sugar is <80 mg/dl, drink 100 ml of apple juice, time limit of 2 hours before  Wear  comfortable loose fitting clothing  Do not use moisturizers, creams, lotions or perfume  All jewelry and valuables should be left at home     CONTACT SURGEON'S OFFICE IF YOU DEVELOP:  * Fever = 100.4 F   * New respiratory symptoms (e.g. cough, shortness of breath, respiratory distress, sore throat)  * Recent loss of taste or smell  *Flu like symptoms such as headache, fatigue or gastrointestinal symptoms  * You develop any open sores, shingles, burning or painful urination   AND/OR:  * You no longer wish to have the surgery.  * Any other personal circumstances change that may lead to the need to cancel or defer this surgery.  *You were admitted to any hospital within one week of your planned procedure.     SMOKING:  *Quitting smoking can make a huge difference to your health and recovery from surgery.    *If you need help with quitting, call 7-800-QUIT-NOW.     THE DAY BEFORE SURGERY:  *Do not eat any food after midnight the night  before your surgery.   SURGICAL TIME:  *You will be contacted between 2 p.m. and 3 p.m. the business day before your surgery with your arrival time.  *If you haven't received a call by 3pm, call [phone]  *Scheduled surgery times may change and you will be notified if this occurs-check your personal voicemail for any updates.     ON THE MORNING OF SURGERY:  *Wear comfortable, loose fitting clothing.   *Do not use moisturizers, creams, lotions or perfume.  *All jewelry and valuables should be left at home.  *Prosthetic devices such as contact lenses, hearing aids, dentures, eyelash extensions, hairpins and body piercing must be removed before surgery.     BRING WITH YOU:  *Photo ID and insurance card  *Current list of medications and allergies  *Pacemaker/Defibrillator/Heart stent cards  *CPAP machine and mask  *Slings/splints/crutches  *Copy of your complete Advanced Directive/DHPOA-if applicable  *Neurostimulator implant remote     PARKING AND ARRIVAL:  *Check in at the Main Entrance desk and let them know you are here for surgery.     IF YOU ARE HAVING OUTPATIENT/SAME DAY SURGERY:  *A responsible adult MUST accompany you at the time of discharge and stay with you for 24 hours after your surgery.  *You may NOT drive yourself home after surgery.  *You may use a taxi or ride sharing service ([employer], Uber) to return home ONLY if you are accompanied by a friend or family member.  *Instructions for resuming your medications will be provided by your surgeon.     Thank you for coming to Pre Admission testing.      If I have prescribed medication please don't forget to  at your pharmacy.      Any questions about today's visit call [phone] and leave a message in the general mailbox.     Patient instructed to ambulate as soon as possible postoperatively to decrease thromboembolic risk.     Etta Epps APRN-CNP     Thank you for visiting [facility].  If you have any changes to your  health condition, please call the surgeons office to alert them and give them details of your symptoms.        Preoperative Fasting Guidelines     Why must I stop eating and drinking near surgery time?  With sedation, food or liquid in your stomach can enter your lungs causing serious complications  Increases nausea and vomiting     When do I need to stop eating and drinking before my surgery?  Do not eat any food after midnight the night before your surgery/procedure.        Additional Instructions:      The Day before Surgery:  -Review your medication instructions, stop indicated medications  -You will be contacted in the evening regarding the time of your arrival to facility and surgery time     Day of Surgery:  -Review your medication instructions, take indicated medications  -Wear comfortable loose fitting clothing  -Do not use moisturizers, creams, lotions or perfume  -All jewelry and valuables should be left at home                   Preoperative Brain Exercises     What are brain exercises?  A brain exercise is any activity that engages your thinking (cognitive) skills.     What types of activities are considered brain exercises?  Jigsaw puzzles, crossword puzzles, word jumble, memory games, word search, and many more.  Many can be found free online or on your phone via a mobile lizz.     Why should I do brain exercises before my surgery?  More recent research has shown brain exercise before surgery can lower the risk of postoperative delirium (confusion) which can be especially important for older adults.  Patients who did brain exercises for 5 to 10 hours the days before surgery, cut their risk of postoperative delirium in half up to 1 week after surgery.                         [facility]     Preoperative Deep Breathing Exercises     Why it is important to do deep breathing exercises before my surgery?  Deep breathing exercises strengthen your breathing muscles.  This helps you  to recover after your surgery and decreases the chance of breathing complications.        How are the deep breathing exercises done?  Sit straight with your back supported.  Breathe in deeply and slowly through your nose. Your lower rib cage should expand and your abdomen may move forward.  Hold that breath for 3 to 5 seconds.  Breathe out through pursed lips, slowly and completely.  Rest and repeat 10 times every hour while awake.  Rest longer if you become dizzy or lightheaded.                      [facility]     Preoperative Deep Breathing Exercises     Why it is important to do deep breathing exercises before my surgery?  Deep breathing exercises strengthen your breathing muscles.  This helps you to recover after your surgery and decreases the chance of breathing complications.        How are the deep breathing exercises done?  Sit straight with your back supported.  Breathe in deeply and slowly through your nose. Your lower rib cage should expand and your abdomen may move forward.  Hold that breath for 3 to 5 seconds.  Breathe out through pursed lips, slowly and completely.  Rest and repeat 10 times every hour while awake.  Rest longer if you become dizzy or lightheaded.        Patient Information: Incentive Spirometer  What is an incentive spirometer?  An incentive spirometer is a device used before and after surgery to “exercise” your lungs.  It helps you to take deeper breaths to expand your lungs.  Below is an example of a basic incentive spirometer.  The device you receive may differ slightly but they all function the same.    Why do I need to use an incentive spirometer?  Using your incentive spirometer prepares your lungs for surgery and helps prevent lung problems after surgery.  How do I use my incentive spirometer?  When you're using your incentive spirometer, make sure to breathe through your mouth. If you breathe through your nose, the incentive spirometer won't work  properly. You can hold your nose if you have trouble.  If you feel dizzy at any time, stop and rest. Try again at a later time.  Follow the steps below:  Set up your incentive spirometer, expand the flexible tubing and connect to the outlet.  Sit upright in a chair or bed. Hold the incentive spirometer at eye level.   Put the mouthpiece in your mouth and close your lips tightly around it. Slowly breathe out (exhale) completely.  Breathe in (inhale) slowly through your mouth as deeply as you can. As you take a breath, you will see the piston rise inside the large column. While the piston rises, the indicator should move upwards. It should stay in between the 2 arrows (see Figure).  Try to get the piston as high as you can, while keeping the indicator between the arrows.   If the indicator doesn't stay between the arrows, you're breathing either too fast or too slow.  When you get it as high as you can, hold your breath for 10 seconds, or as long as possible. While you're holding your breath, the piston will slowly fall to the base of the spirometer.  Once the piston reaches the bottom of the spirometer, breathe out slowly through your mouth. Rest for a few seconds.  Repeat 10 times. Try to get the piston to the same level with each breath.  Repeat every hour while awake  You can carefully clean the outside of the mouthpiece with an alcohol wipe or soap and water.       Patient and Family Education             Ways You Can Help Prevent Blood Clots                    This handout explains some simple things you can do to help prevent blood clots.      Blood clots are blockages that can form in the body's veins. When a blood clot forms in your deep veins, it may be called a deep vein thrombosis, or DVT for short. Blood clots can happen in any part of the body where blood flows, but they are most common in the arms and legs. If a piece of a blood clot breaks free and travels to the lungs, it is called a pulmonary embolus  (PE). A PE can be a very serious problem.         Being in the hospital or having surgery can raise your chances of getting a blood clot because you may not be well enough to move around as much as you normally do.         Ways you can help prevent blood clots in the hospital           Wearing SCDs. SCDs stands for Sequential Compression Devices.   SCDs are special sleeves that wrap around your legs  They attach to a pump that fills them with air to gently squeeze your legs every few minutes.   This helps return the blood in your legs to your heart.   SCDs should only be taken off when walking or bathing.   SCDs may not be comfortable, but they can help save your life.                                            Wearing compression stockings - if your doctor orders them. These special snug fitting stockings gently squeeze your legs to help blood flow.       Walking. Walking helps move the blood in your legs.   If your doctor says it is ok, try walking the halls at least   5 times a day. Ask us to help you get up, so you don't fall.      Taking any blood thinning medicines your doctor orders.        Page 1 of 2            [facility]; 3/23   Ways you can help prevent blood clots at home         Wearing compression stockings - if your doctor orders them. ? Walking - to help move the blood in your legs.       Taking any blood thinning medicines your doctor orders.      Signs of a blood clot or PE        Tell your doctor or nurse know right away if you have of the problems listed below.    If you are at home, seek medical care right away. Call 911 for chest pain or problems breathing.          Signs of a blood clot (DVT) - such as pain,  swelling, redness or warmth in your arm or leg      Signs of a pulmonary embolism (PE) - such as chest pain or feeling short of breath

## 2025-01-22 LAB
ATRIAL RATE: 88 BPM
P AXIS: 75 DEGREES
P OFFSET: 185 MS
P ONSET: 133 MS
PR INTERVAL: 164 MS
Q ONSET: 215 MS
QRS COUNT: 14 BEATS
QRS DURATION: 90 MS
QT INTERVAL: 364 MS
QTC CALCULATION(BAZETT): 440 MS
QTC FREDERICIA: 413 MS
R AXIS: -69 DEGREES
T AXIS: 78 DEGREES
T OFFSET: 397 MS
VENTRICULAR RATE: 88 BPM

## 2025-01-28 LAB
BUN SERPL-MCNC: 13 MG/DL (ref 7–25)
CREAT SERPL-MCNC: 0.91 MG/DL (ref 0.7–1.28)
EGFRCR SERPLBLD CKD-EPI 2021: 86 ML/MIN/1.73M2
PSA SERPL-MCNC: 10.24 NG/ML

## 2025-02-05 ENCOUNTER — APPOINTMENT (OUTPATIENT)
Dept: LAB | Facility: HOSPITAL | Age: 80
End: 2025-02-05
Payer: MEDICARE

## 2025-02-05 ENCOUNTER — APPOINTMENT (OUTPATIENT)
Dept: PRIMARY CARE | Facility: CLINIC | Age: 80
End: 2025-02-05
Payer: MEDICARE

## 2025-02-05 VITALS
SYSTOLIC BLOOD PRESSURE: 136 MMHG | BODY MASS INDEX: 24.29 KG/M2 | HEART RATE: 80 BPM | TEMPERATURE: 96.9 F | DIASTOLIC BLOOD PRESSURE: 80 MMHG | HEIGHT: 69 IN | WEIGHT: 164 LBS | OXYGEN SATURATION: 97 %

## 2025-02-05 DIAGNOSIS — Z01.818 PREOP TESTING: Primary | ICD-10-CM

## 2025-02-05 DIAGNOSIS — E11.40 TYPE 2 DIABETES MELLITUS WITH DIABETIC NEUROPATHY, WITHOUT LONG-TERM CURRENT USE OF INSULIN: ICD-10-CM

## 2025-02-05 DIAGNOSIS — I10 BENIGN ESSENTIAL HYPERTENSION: ICD-10-CM

## 2025-02-05 DIAGNOSIS — R82.6 ABNORMAL URINE LEVELS OF SUBSTANCES CHIEFLY NONMEDICINAL AS TO SOURCE: ICD-10-CM

## 2025-02-05 DIAGNOSIS — E11.42 TYPE 2 DIABETES MELLITUS WITH DIABETIC POLYNEUROPATHY, WITHOUT LONG-TERM CURRENT USE OF INSULIN: ICD-10-CM

## 2025-02-05 DIAGNOSIS — C61 PROSTATE CANCER (MULTI): ICD-10-CM

## 2025-02-05 DIAGNOSIS — E11.42 TYPE 2 DIABETES MELLITUS WITH DIABETIC POLYNEUROPATHY, WITHOUT LONG-TERM CURRENT USE OF INSULIN: Primary | ICD-10-CM

## 2025-02-05 LAB
ALBUMIN SERPL BCP-MCNC: 4.3 G/DL (ref 3.4–5)
ALP SERPL-CCNC: 48 U/L (ref 33–136)
ALT SERPL W P-5'-P-CCNC: 12 U/L (ref 10–52)
ANION GAP SERPL CALC-SCNC: 15 MMOL/L (ref 10–20)
AST SERPL W P-5'-P-CCNC: 11 U/L (ref 9–39)
BASOPHILS # BLD AUTO: 0.03 X10*3/UL (ref 0–0.1)
BASOPHILS NFR BLD AUTO: 0.4 %
BILIRUB SERPL-MCNC: 0.5 MG/DL (ref 0–1.2)
BUN SERPL-MCNC: 13 MG/DL (ref 6–23)
CALCIUM SERPL-MCNC: 9.9 MG/DL (ref 8.6–10.6)
CHLORIDE SERPL-SCNC: 104 MMOL/L (ref 98–107)
CO2 SERPL-SCNC: 30 MMOL/L (ref 21–32)
CREAT SERPL-MCNC: 0.79 MG/DL (ref 0.5–1.3)
EGFRCR SERPLBLD CKD-EPI 2021: 90 ML/MIN/1.73M*2
EOSINOPHIL # BLD AUTO: 0.21 X10*3/UL (ref 0–0.4)
EOSINOPHIL NFR BLD AUTO: 2.7 %
ERYTHROCYTE [DISTWIDTH] IN BLOOD BY AUTOMATED COUNT: 13.1 % (ref 11.5–14.5)
GLUCOSE SERPL-MCNC: 135 MG/DL (ref 74–99)
HCT VFR BLD AUTO: 50.2 % (ref 41–52)
HGB BLD-MCNC: 14.7 G/DL (ref 13.5–17.5)
IMM GRANULOCYTES # BLD AUTO: 0.03 X10*3/UL (ref 0–0.5)
IMM GRANULOCYTES NFR BLD AUTO: 0.4 % (ref 0–0.9)
LYMPHOCYTES # BLD AUTO: 2.32 X10*3/UL (ref 0.8–3)
LYMPHOCYTES NFR BLD AUTO: 30.3 %
MCH RBC QN AUTO: 29.4 PG (ref 26–34)
MCHC RBC AUTO-ENTMCNC: 29.3 G/DL (ref 32–36)
MCV RBC AUTO: 100 FL (ref 80–100)
MONOCYTES # BLD AUTO: 0.53 X10*3/UL (ref 0.05–0.8)
MONOCYTES NFR BLD AUTO: 6.9 %
NEUTROPHILS # BLD AUTO: 4.54 X10*3/UL (ref 1.6–5.5)
NEUTROPHILS NFR BLD AUTO: 59.3 %
NRBC BLD-RTO: 0 /100 WBCS (ref 0–0)
PLATELET # BLD AUTO: 148 X10*3/UL (ref 150–450)
POTASSIUM SERPL-SCNC: 4.9 MMOL/L (ref 3.5–5.3)
PROT SERPL-MCNC: 6.9 G/DL (ref 6.4–8.2)
RBC # BLD AUTO: 5 X10*6/UL (ref 4.5–5.9)
SODIUM SERPL-SCNC: 144 MMOL/L (ref 136–145)
WBC # BLD AUTO: 7.7 X10*3/UL (ref 4.4–11.3)

## 2025-02-05 PROCEDURE — 1126F AMNT PAIN NOTED NONE PRSNT: CPT | Performed by: STUDENT IN AN ORGANIZED HEALTH CARE EDUCATION/TRAINING PROGRAM

## 2025-02-05 PROCEDURE — 1158F ADVNC CARE PLAN TLK DOCD: CPT | Performed by: STUDENT IN AN ORGANIZED HEALTH CARE EDUCATION/TRAINING PROGRAM

## 2025-02-05 PROCEDURE — 3075F SYST BP GE 130 - 139MM HG: CPT | Performed by: STUDENT IN AN ORGANIZED HEALTH CARE EDUCATION/TRAINING PROGRAM

## 2025-02-05 PROCEDURE — 3079F DIAST BP 80-89 MM HG: CPT | Performed by: STUDENT IN AN ORGANIZED HEALTH CARE EDUCATION/TRAINING PROGRAM

## 2025-02-05 PROCEDURE — G2211 COMPLEX E/M VISIT ADD ON: HCPCS | Performed by: STUDENT IN AN ORGANIZED HEALTH CARE EDUCATION/TRAINING PROGRAM

## 2025-02-05 PROCEDURE — 80053 COMPREHEN METABOLIC PANEL: CPT

## 2025-02-05 PROCEDURE — 85025 COMPLETE CBC W/AUTO DIFF WBC: CPT

## 2025-02-05 PROCEDURE — 99214 OFFICE O/P EST MOD 30 MIN: CPT | Performed by: STUDENT IN AN ORGANIZED HEALTH CARE EDUCATION/TRAINING PROGRAM

## 2025-02-05 PROCEDURE — 1036F TOBACCO NON-USER: CPT | Performed by: STUDENT IN AN ORGANIZED HEALTH CARE EDUCATION/TRAINING PROGRAM

## 2025-02-05 PROCEDURE — 1123F ACP DISCUSS/DSCN MKR DOCD: CPT | Performed by: STUDENT IN AN ORGANIZED HEALTH CARE EDUCATION/TRAINING PROGRAM

## 2025-02-05 RX ORDER — GABAPENTIN 300 MG/1
300 CAPSULE ORAL 2 TIMES DAILY
Qty: 180 CAPSULE | Refills: 3 | Status: SHIPPED | OUTPATIENT
Start: 2025-02-05 | End: 2026-02-05

## 2025-02-05 RX ORDER — GABAPENTIN 100 MG/1
100 CAPSULE ORAL NIGHTLY
Qty: 90 CAPSULE | Refills: 2 | Status: SHIPPED | OUTPATIENT
Start: 2025-02-05 | End: 2025-02-06

## 2025-02-05 ASSESSMENT — PAIN SCALES - GENERAL: PAINLEVEL_OUTOF10: 0-NO PAIN

## 2025-02-05 NOTE — PROGRESS NOTES
"Subjective   Patient ID: Boni Gongora is a 79 y.o. male who presents for No chief complaint on file..    HPI   Patient transitioning care from Dr. Darrick Morataya who has since retired. Please refer to his excellent notes for full details.     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 Two children, one local. 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     Re: DM2 - A1c at goal. Patient denies symptomatic highs or lows with regards to their glucose. Reports no polyuria, polydipsia, fatigue, and vision changes. Has chronic peripheral neuropathy in his feet, controlled on gabapentin. Reports fair foot hygiene, denies knowledge of any foot ulcers or trauma.     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 He is having markers placed soon and then radiation treatments 2/2025. PSA is being trended.     Re: Anxiety - longstanding diagnosis. Takes SSRI. Doing very well on this medication.     Re: HM - shots UTD. CRS no longer indicated. PSA through urology.     PMHx, FHx, Social Hx, Surg Hx personally reviewed at this appointment. No pertinent findings and/or changes from prior (if applicable).    ROS: Denies wt gain/loss f/c HA LoC CP SOB NVDC. See HPI above, and scanned sheet (if applicable). All other systems are reviewed and are without complaint.      Review of Systems    Objective   /80   Pulse 80   Temp 36.1 °C (96.9 °F)   Ht 1.753 m (5' 9\")   Wt 74.4 kg (164 lb)   SpO2 97%   BMI 24.22 kg/m²     Physical Exam  Gen: NAD. AAO x3.  HEENT: NC/AT. Anicteric sclera, symmetric pupils. MMM no thrush.  Neck: Soft, supple. No LAD. No goiter.  CV: RRR nl s1s2 no m/r/g  Pulm: CTAB no w/r/r, good air exchange  GI: soft NTND BS+ no hsm  Ext: WWP no edema  Neuro: II-XII grossly intact, nonfocal systemic findings  MSK: 5/5 strength b/l UE and LE  Gait: unremarkable   Feet: no ulcers, nl monofilament testing     Lab Results   Component Value Date    WBC 7.4 " 06/12/2024    HGB 14.5 06/12/2024    HCT 46.3 06/12/2024     (L) 06/12/2024    CHOL 132 06/12/2024    TRIG 131 06/12/2024    HDL 65.6 06/12/2024    ALT 9 (L) 06/12/2024    AST 4 (L) 06/12/2024     06/12/2024    K 5.2 06/12/2024     06/12/2024    CREATININE 0.91 01/27/2025    BUN 13 01/27/2025    CO2 29 06/12/2024    TSH 1.06 07/28/2022    PSA 10.24 (H) 01/27/2025    INR 1.0 01/26/2024    HGBA1C 6.1 (H) 12/10/2024     par    ECG 12 lead (Clinic Performed)  Normal sinus rhythm  Left axis deviation  Pulmonary disease pattern  Inferior infarct , age undetermined  Abnormal ECG  When compared with ECG of 26-JAN-2024 10:52,  Premature ventricular complexes are no longer Present  Confirmed by Lacie Vivar ([id number]) on 1/22/2025 2:08:27 PM      Current Outpatient Medications   Medication Instructions    acetaminophen (Tylenol) 500 mg tablet oral, Every 6 hours PRN    acetaminophen-codeine (Tylenol w/ Codeine #3) 300-30 mg tablet 2 tablets, 2 times daily PRN    cholecalciferol (VITAMIN D-3) 25 mcg, 2 times weekly    cyanocobalamin, vitamin B-12, 1,000 mcg tablet, sublingual 1 tablet, 2 times weekly    diphenhydrAMINE (BENADRYL) 25 mg, Nightly    diphenhydrAMINE-acetaminophen (Tylenol PM)  mg per tablet 1 tablet, Nightly PRN    ferrous sulfate 65 mg, 2 times weekly    flash glucose sensor (FREESTYLE MELVIN 2 SENSOR [facility]) Apply 1 sensor every 14 days to the back of upper arm    FLUoxetine (PROZAC) 20 mg, oral, Every other day    FreeStyle Melvin 2 [place] misc USE AS INSTRUCTED    FreeStyle Melvin 2 Sensor kit APPLY 1 SENSOR EVERY 14 DAYS TO THE BACK OF UPPER ARM.    gabapentin (Neurontin) 100 mg capsule TAKE AN ADDITIONAL 100MG CAPSULE AT HS FOR NEUROPATHIC SYMPTOMS. INCREASE DOSE IN 100MG INCREMENTS IN 2 WEEK INTERVALS UP TO AN EXTRA 300MG AT HS.    gabapentin (NEURONTIN) 300 mg, oral, 2 times daily    Jardiance 25 mg, oral, Daily    metFORMIN (GLUCOPHAGE) 1,000 mg, oral, Take with food.    metroNIDAZOLE  (Metrogel) 0.75 % gel Daily PRN    MULTIVITAMIN ORAL 1 tablet, Daily    omeprazole (PRILOSEC) 20 mg, Daily before breakfast    Ozempic 1 mg, subcutaneous, Once Weekly, SUNDAY    ramipril (ALTACE) 5 mg, oral, Daily    simvastatin (ZOCOR) 20 mg, oral, Every evening        Assessment/Plan   Doing very well.    # DM2: Stable, A1c at/near goal, c/b neuropathy  - continue current meds  - renew gabapentin 300mg AM, 400mg PM  - Routine Diabetic labs  - counselled on wt loss, diet, exercise, and lifestyle modifications  - yearly foot exams, eye exams    # Prostate Cancer: 3+4=7  - follow up rad-onc and urology  - treatments planned 2/25    # Anxiety  - continue SSRI    # s/p gastric surgery, remote  - clinically observe    ,# Health Maintenance  - routine blood work  - Colon Cancer Screening: no longer indicated due to age   - PSA: through urology  - Immunizations:  UTD  - AAA screening:  not indicated

## 2025-02-06 ENCOUNTER — LAB (OUTPATIENT)
Dept: LAB | Facility: HOSPITAL | Age: 80
End: 2025-02-06
Payer: MEDICARE

## 2025-02-06 ENCOUNTER — PATIENT MESSAGE (OUTPATIENT)
Dept: PRIMARY CARE | Facility: CLINIC | Age: 80
End: 2025-02-06

## 2025-02-06 DIAGNOSIS — E11.42 TYPE 2 DIABETES MELLITUS WITH DIABETIC POLYNEUROPATHY, WITHOUT LONG-TERM CURRENT USE OF INSULIN: ICD-10-CM

## 2025-02-06 DIAGNOSIS — R82.90 UNSPECIFIED ABNORMAL FINDINGS IN URINE: ICD-10-CM

## 2025-02-06 DIAGNOSIS — Z01.818 PREOP TESTING: ICD-10-CM

## 2025-02-06 LAB
APPEARANCE UR: CLEAR
BILIRUB UR STRIP.AUTO-MCNC: NEGATIVE MG/DL
COLOR UR: ABNORMAL
GLUCOSE UR STRIP.AUTO-MCNC: ABNORMAL MG/DL
HOLD SPECIMEN: NORMAL
KETONES UR STRIP.AUTO-MCNC: NEGATIVE MG/DL
LEUKOCYTE ESTERASE UR QL STRIP.AUTO: NEGATIVE
NITRITE UR QL STRIP.AUTO: NEGATIVE
PH UR STRIP.AUTO: 5 [PH]
PROT UR STRIP.AUTO-MCNC: NEGATIVE MG/DL
RBC # UR STRIP.AUTO: NEGATIVE MG/DL
SP GR UR STRIP.AUTO: 1.03
UROBILINOGEN UR STRIP.AUTO-MCNC: NORMAL MG/DL

## 2025-02-06 PROCEDURE — 81003 URINALYSIS AUTO W/O SCOPE: CPT

## 2025-02-06 RX ORDER — GABAPENTIN 100 MG/1
CAPSULE ORAL
Qty: 90 CAPSULE | Refills: 2 | Status: SHIPPED | OUTPATIENT
Start: 2025-02-06

## 2025-02-07 LAB — HOLD SPECIMEN: NORMAL

## 2025-02-10 DIAGNOSIS — C61 PROSTATE CANCER (MULTI): Primary | ICD-10-CM

## 2025-02-11 ENCOUNTER — ANESTHESIA EVENT (OUTPATIENT)
Dept: OPERATING ROOM | Facility: HOSPITAL | Age: 80
End: 2025-02-11
Payer: MEDICARE

## 2025-02-11 ENCOUNTER — ANESTHESIA (OUTPATIENT)
Dept: OPERATING ROOM | Facility: HOSPITAL | Age: 80
End: 2025-02-11
Payer: MEDICARE

## 2025-02-11 ENCOUNTER — HOSPITAL ENCOUNTER (OUTPATIENT)
Facility: HOSPITAL | Age: 80
Setting detail: OUTPATIENT SURGERY
Discharge: HOME | End: 2025-02-11
Attending: STUDENT IN AN ORGANIZED HEALTH CARE EDUCATION/TRAINING PROGRAM | Admitting: STUDENT IN AN ORGANIZED HEALTH CARE EDUCATION/TRAINING PROGRAM
Payer: MEDICARE

## 2025-02-11 VITALS
SYSTOLIC BLOOD PRESSURE: 171 MMHG | DIASTOLIC BLOOD PRESSURE: 94 MMHG | BODY MASS INDEX: 24.48 KG/M2 | TEMPERATURE: 97.3 F | WEIGHT: 165.79 LBS | OXYGEN SATURATION: 98 % | RESPIRATION RATE: 16 BRPM | HEART RATE: 71 BPM

## 2025-02-11 DIAGNOSIS — C61 PROSTATE CANCER (MULTI): ICD-10-CM

## 2025-02-11 LAB — GLUCOSE BLD MANUAL STRIP-MCNC: 92 MG/DL (ref 74–99)

## 2025-02-11 PROCEDURE — 3600000009 HC OR TIME - EACH INCREMENTAL 1 MINUTE - PROCEDURE LEVEL FOUR: Performed by: STUDENT IN AN ORGANIZED HEALTH CARE EDUCATION/TRAINING PROGRAM

## 2025-02-11 PROCEDURE — 3700000002 HC GENERAL ANESTHESIA TIME - EACH INCREMENTAL 1 MINUTE: Performed by: STUDENT IN AN ORGANIZED HEALTH CARE EDUCATION/TRAINING PROGRAM

## 2025-02-11 PROCEDURE — 2500000004 HC RX 250 GENERAL PHARMACY W/ HCPCS (ALT 636 FOR OP/ED): Performed by: NURSE ANESTHETIST, CERTIFIED REGISTERED

## 2025-02-11 PROCEDURE — 7100000002 HC RECOVERY ROOM TIME - EACH INCREMENTAL 1 MINUTE: Performed by: STUDENT IN AN ORGANIZED HEALTH CARE EDUCATION/TRAINING PROGRAM

## 2025-02-11 PROCEDURE — A55874 PR TRANSPERINEAL PLACEMENT OF BIODEGRADABLE MATERIAL, PERI-PROSTATIC, SINGLE OR MULTIPLE: Performed by: NURSE ANESTHETIST, CERTIFIED REGISTERED

## 2025-02-11 PROCEDURE — 76872 US TRANSRECTAL: CPT | Performed by: STUDENT IN AN ORGANIZED HEALTH CARE EDUCATION/TRAINING PROGRAM

## 2025-02-11 PROCEDURE — 2500000004 HC RX 250 GENERAL PHARMACY W/ HCPCS (ALT 636 FOR OP/ED): Performed by: STUDENT IN AN ORGANIZED HEALTH CARE EDUCATION/TRAINING PROGRAM

## 2025-02-11 PROCEDURE — C1889 IMPLANT/INSERT DEVICE, NOC: HCPCS | Performed by: STUDENT IN AN ORGANIZED HEALTH CARE EDUCATION/TRAINING PROGRAM

## 2025-02-11 PROCEDURE — 7100000001 HC RECOVERY ROOM TIME - INITIAL BASE CHARGE: Performed by: STUDENT IN AN ORGANIZED HEALTH CARE EDUCATION/TRAINING PROGRAM

## 2025-02-11 PROCEDURE — 3600000004 HC OR TIME - INITIAL BASE CHARGE - PROCEDURE LEVEL FOUR: Performed by: STUDENT IN AN ORGANIZED HEALTH CARE EDUCATION/TRAINING PROGRAM

## 2025-02-11 PROCEDURE — 82947 ASSAY GLUCOSE BLOOD QUANT: CPT

## 2025-02-11 PROCEDURE — 55874 TPRNL PLMT BIODEGRDABL MATRL: CPT | Performed by: STUDENT IN AN ORGANIZED HEALTH CARE EDUCATION/TRAINING PROGRAM

## 2025-02-11 PROCEDURE — A55874 PR TRANSPERINEAL PLACEMENT OF BIODEGRADABLE MATERIAL, PERI-PROSTATIC, SINGLE OR MULTIPLE: Performed by: ANESTHESIOLOGY

## 2025-02-11 PROCEDURE — 99100 ANES PT EXTEME AGE<1 YR&>70: CPT | Performed by: ANESTHESIOLOGY

## 2025-02-11 PROCEDURE — 7100000010 HC PHASE TWO TIME - EACH INCREMENTAL 1 MINUTE: Performed by: STUDENT IN AN ORGANIZED HEALTH CARE EDUCATION/TRAINING PROGRAM

## 2025-02-11 PROCEDURE — 2780000003 HC OR 278 NO HCPCS: Performed by: STUDENT IN AN ORGANIZED HEALTH CARE EDUCATION/TRAINING PROGRAM

## 2025-02-11 PROCEDURE — 55876 PLACE RT DEVICE/MARKER PROS: CPT | Performed by: STUDENT IN AN ORGANIZED HEALTH CARE EDUCATION/TRAINING PROGRAM

## 2025-02-11 PROCEDURE — 3700000001 HC GENERAL ANESTHESIA TIME - INITIAL BASE CHARGE: Performed by: STUDENT IN AN ORGANIZED HEALTH CARE EDUCATION/TRAINING PROGRAM

## 2025-02-11 PROCEDURE — 7100000009 HC PHASE TWO TIME - INITIAL BASE CHARGE: Performed by: STUDENT IN AN ORGANIZED HEALTH CARE EDUCATION/TRAINING PROGRAM

## 2025-02-11 DEVICE — GOLD MARKERS, 1.2MM, SOFT TISSUE, 20CM 17GA NEEDLES, 3-PK, STRL: Type: IMPLANTABLE DEVICE | Site: PROSTATE | Status: FUNCTIONAL

## 2025-02-11 RX ORDER — CEFAZOLIN SODIUM 2 G/100ML
2 INJECTION, SOLUTION INTRAVENOUS ONCE
Status: COMPLETED | OUTPATIENT
Start: 2025-02-11 | End: 2025-02-11

## 2025-02-11 RX ORDER — SODIUM CHLORIDE, SODIUM LACTATE, POTASSIUM CHLORIDE, CALCIUM CHLORIDE 600; 310; 30; 20 MG/100ML; MG/100ML; MG/100ML; MG/100ML
INJECTION, SOLUTION INTRAVENOUS CONTINUOUS PRN
Status: DISCONTINUED | OUTPATIENT
Start: 2025-02-11 | End: 2025-02-11

## 2025-02-11 RX ORDER — SODIUM CHLORIDE, SODIUM LACTATE, POTASSIUM CHLORIDE, CALCIUM CHLORIDE 600; 310; 30; 20 MG/100ML; MG/100ML; MG/100ML; MG/100ML
100 INJECTION, SOLUTION INTRAVENOUS CONTINUOUS
Status: DISCONTINUED | OUTPATIENT
Start: 2025-02-11 | End: 2025-02-11 | Stop reason: HOSPADM

## 2025-02-11 RX ORDER — IPRATROPIUM BROMIDE 0.5 MG/2.5ML
500 SOLUTION RESPIRATORY (INHALATION) ONCE
Status: DISCONTINUED | OUTPATIENT
Start: 2025-02-11 | End: 2025-02-11 | Stop reason: HOSPADM

## 2025-02-11 RX ORDER — ONDANSETRON HYDROCHLORIDE 2 MG/ML
INJECTION, SOLUTION INTRAVENOUS AS NEEDED
Status: DISCONTINUED | OUTPATIENT
Start: 2025-02-11 | End: 2025-02-11

## 2025-02-11 RX ORDER — LABETALOL HYDROCHLORIDE 5 MG/ML
5 INJECTION, SOLUTION INTRAVENOUS ONCE AS NEEDED
Status: DISCONTINUED | OUTPATIENT
Start: 2025-02-11 | End: 2025-02-11 | Stop reason: HOSPADM

## 2025-02-11 RX ORDER — FENTANYL CITRATE 50 UG/ML
25 INJECTION, SOLUTION INTRAMUSCULAR; INTRAVENOUS EVERY 5 MIN PRN
Status: DISCONTINUED | OUTPATIENT
Start: 2025-02-11 | End: 2025-02-11 | Stop reason: HOSPADM

## 2025-02-11 RX ORDER — FENTANYL CITRATE 50 UG/ML
INJECTION, SOLUTION INTRAMUSCULAR; INTRAVENOUS AS NEEDED
Status: DISCONTINUED | OUTPATIENT
Start: 2025-02-11 | End: 2025-02-11

## 2025-02-11 RX ORDER — PROPOFOL 10 MG/ML
INJECTION, EMULSION INTRAVENOUS AS NEEDED
Status: DISCONTINUED | OUTPATIENT
Start: 2025-02-11 | End: 2025-02-11

## 2025-02-11 RX ORDER — KETOROLAC TROMETHAMINE 30 MG/ML
INJECTION, SOLUTION INTRAMUSCULAR; INTRAVENOUS AS NEEDED
Status: DISCONTINUED | OUTPATIENT
Start: 2025-02-11 | End: 2025-02-11

## 2025-02-11 RX ORDER — LIDOCAINE HYDROCHLORIDE 10 MG/ML
INJECTION, SOLUTION EPIDURAL; INFILTRATION; INTRACAUDAL; PERINEURAL AS NEEDED
Status: DISCONTINUED | OUTPATIENT
Start: 2025-02-11 | End: 2025-02-11

## 2025-02-11 RX ORDER — HYDRALAZINE HYDROCHLORIDE 20 MG/ML
5 INJECTION INTRAMUSCULAR; INTRAVENOUS EVERY 30 MIN PRN
Status: DISCONTINUED | OUTPATIENT
Start: 2025-02-11 | End: 2025-02-11 | Stop reason: HOSPADM

## 2025-02-11 RX ORDER — FENTANYL CITRATE 50 UG/ML
50 INJECTION, SOLUTION INTRAMUSCULAR; INTRAVENOUS EVERY 5 MIN PRN
Status: DISCONTINUED | OUTPATIENT
Start: 2025-02-11 | End: 2025-02-11 | Stop reason: HOSPADM

## 2025-02-11 RX ORDER — ALBUTEROL SULFATE 0.83 MG/ML
2.5 SOLUTION RESPIRATORY (INHALATION) ONCE AS NEEDED
Status: DISCONTINUED | OUTPATIENT
Start: 2025-02-11 | End: 2025-02-11 | Stop reason: HOSPADM

## 2025-02-11 RX ADMIN — FENTANYL CITRATE 50 MCG: 0.05 INJECTION, SOLUTION INTRAMUSCULAR; INTRAVENOUS at 08:24

## 2025-02-11 RX ADMIN — CEFAZOLIN SODIUM 2 G: 2 INJECTION, SOLUTION INTRAVENOUS at 08:19

## 2025-02-11 RX ADMIN — DEXAMETHASONE SODIUM PHOSPHATE 4 MG: 4 INJECTION, SOLUTION INTRAMUSCULAR; INTRAVENOUS at 08:28

## 2025-02-11 RX ADMIN — PROPOFOL 50 MG: 10 INJECTION, EMULSION INTRAVENOUS at 08:35

## 2025-02-11 RX ADMIN — PROPOFOL 100 MG: 10 INJECTION, EMULSION INTRAVENOUS at 08:26

## 2025-02-11 RX ADMIN — ONDANSETRON 4 MG: 2 INJECTION, SOLUTION INTRAMUSCULAR; INTRAVENOUS at 08:30

## 2025-02-11 RX ADMIN — LIDOCAINE HYDROCHLORIDE 5 ML: 10 INJECTION, SOLUTION EPIDURAL; INFILTRATION; INTRACAUDAL; PERINEURAL at 08:26

## 2025-02-11 RX ADMIN — KETOROLAC TROMETHAMINE 15 MG: 30 INJECTION, SOLUTION INTRAMUSCULAR at 08:33

## 2025-02-11 RX ADMIN — SODIUM CHLORIDE, POTASSIUM CHLORIDE, SODIUM LACTATE AND CALCIUM CHLORIDE: 600; 310; 30; 20 INJECTION, SOLUTION INTRAVENOUS at 08:19

## 2025-02-11 SDOH — HEALTH STABILITY: MENTAL HEALTH: CURRENT SMOKER: 0

## 2025-02-11 ASSESSMENT — PAIN SCALES - GENERAL
PAINLEVEL_OUTOF10: 0 - NO PAIN
PAIN_LEVEL: 0
PAINLEVEL_OUTOF10: 0 - NO PAIN

## 2025-02-11 ASSESSMENT — PAIN - FUNCTIONAL ASSESSMENT
PAIN_FUNCTIONAL_ASSESSMENT: 0-10

## 2025-02-11 ASSESSMENT — COLUMBIA-SUICIDE SEVERITY RATING SCALE - C-SSRS
6. HAVE YOU EVER DONE ANYTHING, STARTED TO DO ANYTHING, OR PREPARED TO DO ANYTHING TO END YOUR LIFE?: NO
2. HAVE YOU ACTUALLY HAD ANY THOUGHTS OF KILLING YOURSELF?: NO
1. IN THE PAST MONTH, HAVE YOU WISHED YOU WERE DEAD OR WISHED YOU COULD GO TO SLEEP AND NOT WAKE UP?: NO

## 2025-02-11 NOTE — OP NOTE
Insertion Fiducial Marker Prostate Operative Note     Date: 2025  OR Location: [facility]    Name: Boni Gongora, : 1945, Age: 79 y.o., MRN: [id number], Sex: male    Diagnosis  Pre-op Diagnosis      * Prostate cancer (Multi) [C61] Post-op Diagnosis     * Prostate cancer (Multi) [C61]     Procedures  Insertion Fiducial Marker Prostate  49325 - AK PLMT INTERSTITIAL DEV RADIAT TX PROSTATE 1/MULT    AK TRANSPERINEAL PLMT BIODEGRADABLE MATRL 1/MLT NJX [70703]  Surgeons      * Jose David Gordon - Primary         Preoperative Diagnosis  Prostate cancer.       Postoperative Diagnosis  Same.       Procedure Performed  SpaceOAR and Fiducial placement, Transrectal ultrasound .   Surgeon  Jose David Gordon MD ([id number]) - Urology.   Assisted by  N/A.      Details of Procedure     Anesthesia  MAC.   Estimated Blood Loss (ml)  5.   Specimen(s) Removed  None.   Drain(s)  None.   Implant(s)  None.   Complications  None.   Indications (History)  Prostate cancer.   Findings of Procedure  40g heterogenous.   Description of Procedure  After administration of anesthesia and antibiotics, the patient was placed in the dorsal lithotomy position and prepped and draped in the usual sterile fashion. A prostate block was performed and transrectal ultrasound using a stepper. The prostate was measured. There were some hypoechoic areas. Fiducials were placed at the base, apex and mid prostate. The perirectal fat was identified using the finder needle with excellent dispersion of saline at the mid prostate. SpaceOAR Twila was then placed in this space. Excellent placement was confirmed in both the transverse and saggital planes. There was no puncture of the rectal wall during the procedure. .

## 2025-02-11 NOTE — ANESTHESIA PREPROCEDURE EVALUATION
Patient: Boni Gongora    Procedure Information       Date/Time: 02/11/25 0805    Procedure: Insertion Fiducial Marker Prostate    Location: [facility] / [facility]    Surgeons: Jose David Gordon MD            Relevant Problems   Anesthesia (within normal limits)      Cardiac   (+) Benign essential hypertension   (+) Hypercholesterolemia   (+) Premature beats      Pulmonary   (+) HEMANT (obstructive sleep apnea)      Neuro (within normal limits)      GI   (+) GERD (gastroesophageal reflux disease) (Well-controlled)      /Renal   (+) Kidney stone   (+) Prostate cancer (Multi)      Liver (within normal limits)      Endocrine   (+) Diabetes mellitus, type 2 (Multi) (Takes Ozempic, last dose 10D ago)   (+) Diabetic retinopathy, background (Multi)      Hematology (within normal limits)      Musculoskeletal (within normal limits)      HEENT (within normal limits)      ID   (+) Helicobacter positive gastritis      Skin (within normal limits)      GYN (within normal limits)     Past Surgical History:   Procedure Laterality Date   • ADENOIDECTOMY     • BARIATRIC SURGERY  04/16/2014   • KIDNEY STONE SURGERY     • KNEE SURGERY  08/18/2014    Knee Surgery   • OTHER SURGICAL HISTORY  06/09/2021    Cataract surgery   • OTHER SURGICAL HISTORY  04/18/2022    Gastric Surgery For Morbid Obesity Laparoscopic Longitudinal Gastrectomy       Clinical information reviewed:   Tobacco  Allergies  Meds   Med Hx  Surg Hx   Fam Hx  Soc Hx        NPO Detail:  NPO/Void Status  Date of Last Liquid: 02/10/25  Time of Last Liquid: 2200  Date of Last Solid: 02/10/25  Time of Last Solid: 2100  Last Intake Type: Clear fluids  Time of Last Void: 0640      Lab Results   Component Value Date    WBC 7.7 02/05/2025    HGB 14.7 02/05/2025    HCT 50.2 02/05/2025     02/05/2025     (L) 02/05/2025          Chemistry    Lab Results   Component Value Date/Time     02/05/2025 1600    K 4.9 02/05/2025 1600     02/05/2025 1600     CO2 30 02/05/2025 1600    BUN 13 02/05/2025 1600    BUN 13 01/27/2025 1234    CREATININE 0.79 02/05/2025 1600    CREATININE 0.91 01/27/2025 1234    Lab Results   Component Value Date/Time    CALCIUM 9.9 02/05/2025 1600    ALKPHOS 48 02/05/2025 1600    AST 11 02/05/2025 1600    ALT 12 02/05/2025 1600    BILITOT 0.5 02/05/2025 1600             Physical Exam    Airway  Mallampati: I  TM distance: >3 FB  Neck ROM: full     Cardiovascular    Dental    Pulmonary    Abdominal        Anesthesia Plan    History of general anesthesia?: yes  History of complications of general anesthesia?: no    ASA 2     MAC     The patient is not a current smoker.  Patient was not previously instructed to abstain from smoking on day of procedure.  Patient did not smoke on day of procedure.  Education provided regarding risk of obstructive sleep apnea.  intravenous induction   Anesthetic plan and risks discussed with patient.  Use of blood products discussed with patient who consented to blood products.    Plan discussed with CRNA and CAA.

## 2025-02-11 NOTE — H&P
History Of Present Illness  Boni Gongora is a 79 y.o. male presenting with prostate cancer.     Past Medical History  He has a past medical history of BPH (benign prostatic hyperplasia), CPAP (continuous positive airway pressure) dependence, Diabetes mellitus (Multi), Diplopia (05/11/2016), Diplopia (05/10/2016), Diplopia (05/10/2016), Diplopia (05/10/2016), Diplopia (05/10/2016), GERD (gastroesophageal reflux disease), Hesitancy of micturition (09/16/2015), Hyperlipidemia, Hypertension, Personal history of other endocrine, nutritional and metabolic disease, Personal history of other mental and behavioral disorders (03/24/2014), Prostate cancer (Multi), Sleep apnea, Type 2 diabetes mellitus, Urinary tract infection, site not specified (10/28/2019), and Vitamin D deficiency, unspecified (06/04/2014).    He has no past medical history of Asthma, Awareness under anesthesia, CHF (congestive heart failure), COPD (chronic obstructive pulmonary disease) (Multi), Delayed emergence from general anesthesia, Diabetes mellitus type I (Multi), Hard to intubate, Irregular heart beat, Malignant hyperthermia, Myocardial infarction (Multi), PONV (postoperative nausea and vomiting), Refusal of blood product, Seizures (Multi), or Stroke (Multi).    Surgical History  He has a past surgical history that includes Other surgical history (06/09/2021); Other surgical history (04/18/2022); Knee surgery (08/18/2014); Adenoidectomy; Bariatric Surgery (04/16/2014); and Kidney stone surgery.     Social History  He reports that he quit smoking about 57 years ago. His smoking use included cigarettes. He started smoking about 62 years ago. He has a 2.5 pack-year smoking history. He has been exposed to tobacco smoke. He has never used smokeless tobacco. He reports that he does not drink alcohol and does not use drugs.     Allergies  Morphine and Pollen extracts    ROS  Patient denies ocular pain, redness, discharge, decreased vision, double  vision, blind spots, flashes, or floaters.     Physical Exam  Not recorded          Assessment/Plan   Assessment & Plan  Prostate cancer (Multi)    Diabetes mellitus, type 2 (Multi)    HEMANT (obstructive sleep apnea)      Spaceoar and fiducials       I spent  minutes in the professional and overall care of this patient.      Jose David Gordon MD

## 2025-02-11 NOTE — ANESTHESIA POSTPROCEDURE EVALUATION
Patient: Boni Gongora    Procedure Summary       Date: 02/11/25 Room / Location: [facility]    Anesthesia Start: 0819 Anesthesia Stop: 0849    Procedure: Insertion Fiducial Marker Prostate Diagnosis:       Prostate cancer (Multi)      (Prostate cancer (Multi) [C61])    Surgeons: Jose David Gordon MD Responsible Provider: Alma Pop MD MPH    Anesthesia Type: MAC ASA Status: 2            Anesthesia Type: MAC    Vitals Value Taken Time   /98 02/11/25 0915   Temp 36.2 °C (97.2 °F) 02/11/25 0915   Pulse 77 02/11/25 0915   Resp 16 02/11/25 0915   SpO2 97 % 02/11/25 0915       Anesthesia Post Evaluation    Patient location during evaluation: PACU  Patient participation: complete - patient participated  Level of consciousness: awake and alert  Pain score: 0  Pain management: adequate  Multimodal analgesia pain management approach  Airway patency: patent  Two or more strategies used to mitigate risk of obstructive sleep apnea  Cardiovascular status: acceptable  Respiratory status: acceptable  Hydration status: acceptable  Postoperative Nausea and Vomiting: none    No notable events documented.

## 2025-02-11 NOTE — DISCHARGE INSTRUCTIONS
Dr. Gordon - [facility]  Phone: [phone]    Follow-Up Information  For patients receiving spaceoar and Fiducial Markers, please follow up with Radiology Oncology as scheduled for Planning.    Medication  Please take the medications as prescribed by your doctor. Tylenol or non-steroids! anti-inflammatory medications (such as Aleve®) should relieve mild pain and discomfort. Resume the usual medications you took before surgery unless instructed otherwise.    Resuming Activities and Driving  *NO Driving on the day of surgery  *You may resume driving the day after surgery  *You may resume normal activities as tolerated  *You may shower     Diet  You may resume your normal diet once at home, with no additional considerations.    Expected Signs and Symptoms  You may have a small amount of bleeding with urination on occasion. This may be accompanied with small blood clots. This is normal and should be relieved by increasing your fluid intake.  You may experience some mild burning and discomfort during urination. This is normal and should subside in one to two weeks.      When to Call Your Doctor - Dr. Gordon [phone]  Please call the office immediately if any of the following symptoms appear:    *Inability to eat, drink, or take medication  *Persistent Nausea or Vomiting  *Fever over 100.4 degrees F. (38 degrees C.)    Please call 9-1-1 if you experience any of the following:  *Chest Pain, Shortness of Breath or Difficulty Breathing  *Sudden one sided weakness/slurred speech/ any signs or symptoms of a stroke  *Severe headache or visual disturbance    Additional Home-Going Instructions for Adults Who Have Had Anesthesia or Sedatives:    The anesthetics, sedatives and pain killers which were given to you will be acting in your body for the next 24 hours.  This may cause you to feel sleepy.  This feeling will slowly wear off.    For the next 24 hours you SHOULD NOT:  *Drive a car, or operate machinery or power  tools  *Drink any form of alcohol (including beer or wine)  *Make any important Decisions

## 2025-02-15 DIAGNOSIS — E11.42 TYPE 2 DIABETES MELLITUS WITH DIABETIC POLYNEUROPATHY, WITHOUT LONG-TERM CURRENT USE OF INSULIN: ICD-10-CM

## 2025-02-18 ENCOUNTER — HOSPITAL ENCOUNTER (OUTPATIENT)
Dept: RADIOLOGY | Facility: EXTERNAL LOCATION | Age: 80
Discharge: HOME | End: 2025-02-18

## 2025-02-18 ENCOUNTER — HOSPITAL ENCOUNTER (OUTPATIENT)
Dept: RADIATION ONCOLOGY | Facility: HOSPITAL | Age: 80
Setting detail: RADIATION/ONCOLOGY SERIES
Discharge: HOME | End: 2025-02-18
Payer: MEDICARE

## 2025-02-18 DIAGNOSIS — C61 MALIGNANT NEOPLASM OF PROSTATE (MULTI): Primary | ICD-10-CM

## 2025-02-18 DIAGNOSIS — C61 MALIGNANT NEOPLASM OF PROSTATE (MULTI): ICD-10-CM

## 2025-02-18 PROCEDURE — 77290 THER RAD SIMULAJ FIELD CPLX: CPT | Performed by: STUDENT IN AN ORGANIZED HEALTH CARE EDUCATION/TRAINING PROGRAM

## 2025-02-18 PROCEDURE — 77334 RADIATION TREATMENT AID(S): CPT | Performed by: STUDENT IN AN ORGANIZED HEALTH CARE EDUCATION/TRAINING PROGRAM

## 2025-02-21 ENCOUNTER — HOSPITAL ENCOUNTER (OUTPATIENT)
Dept: RADIATION ONCOLOGY | Facility: HOSPITAL | Age: 80
Setting detail: RADIATION/ONCOLOGY SERIES
Discharge: HOME | End: 2025-02-21
Payer: MEDICARE

## 2025-02-21 PROCEDURE — 77334 RADIATION TREATMENT AID(S): CPT | Performed by: STUDENT IN AN ORGANIZED HEALTH CARE EDUCATION/TRAINING PROGRAM

## 2025-02-21 PROCEDURE — 77300 RADIATION THERAPY DOSE PLAN: CPT | Performed by: STUDENT IN AN ORGANIZED HEALTH CARE EDUCATION/TRAINING PROGRAM

## 2025-02-21 PROCEDURE — 77295 3-D RADIOTHERAPY PLAN: CPT | Performed by: STUDENT IN AN ORGANIZED HEALTH CARE EDUCATION/TRAINING PROGRAM

## 2025-02-21 RX ORDER — GABAPENTIN 100 MG/1
CAPSULE ORAL
Qty: 90 CAPSULE | Refills: 2 | OUTPATIENT
Start: 2025-02-21

## 2025-02-23 RX ORDER — GABAPENTIN 100 MG/1
100 CAPSULE ORAL NIGHTLY
Qty: 90 CAPSULE | Refills: 3 | Status: SHIPPED | OUTPATIENT
Start: 2025-02-23

## 2025-02-24 ENCOUNTER — APPOINTMENT (OUTPATIENT)
Dept: RADIATION ONCOLOGY | Facility: HOSPITAL | Age: 80
End: 2025-02-24
Payer: MEDICARE

## 2025-02-24 PROCEDURE — 77370 RADIATION PHYSICS CONSULT: CPT | Performed by: STUDENT IN AN ORGANIZED HEALTH CARE EDUCATION/TRAINING PROGRAM

## 2025-03-04 ENCOUNTER — HOSPITAL ENCOUNTER (OUTPATIENT)
Dept: RADIATION ONCOLOGY | Facility: HOSPITAL | Age: 80
Setting detail: RADIATION/ONCOLOGY SERIES
Discharge: HOME | End: 2025-03-04
Payer: MEDICARE

## 2025-03-04 DIAGNOSIS — C61 MALIGNANT NEOPLASM OF PROSTATE (MULTI): ICD-10-CM

## 2025-03-04 DIAGNOSIS — Z51.0 ENCOUNTER FOR ANTINEOPLASTIC RADIATION THERAPY: ICD-10-CM

## 2025-03-04 LAB
RAD ONC MSQ ACTUAL FRACTIONS DELIVERED: 1
RAD ONC MSQ ACTUAL SESSION DELIVERED DOSE: 800 CGRAY
RAD ONC MSQ ACTUAL TOTAL DOSE: 800 CGRAY
RAD ONC MSQ ELAPSED DAYS: 0
RAD ONC MSQ LAST DATE: NORMAL
RAD ONC MSQ PRESCRIBED FRACTIONAL DOSE: 800 CGRAY
RAD ONC MSQ PRESCRIBED NUMBER OF FRACTIONS: 5
RAD ONC MSQ PRESCRIBED TECHNIQUE: NORMAL
RAD ONC MSQ PRESCRIBED TOTAL DOSE: 4000 CGRAY
RAD ONC MSQ PRESCRIPTION PATTERN COMMENT: NORMAL
RAD ONC MSQ START DATE: NORMAL
RAD ONC MSQ TREATMENT COURSE NUMBER: 1
RAD ONC MSQ TREATMENT SITE: NORMAL

## 2025-03-04 PROCEDURE — 77280 THER RAD SIMULAJ FIELD SMPL: CPT | Performed by: RADIOLOGY

## 2025-03-04 PROCEDURE — 77373 STRTCTC BDY RAD THER TX DLVR: CPT | Performed by: RADIOLOGY

## 2025-03-06 ENCOUNTER — HOSPITAL ENCOUNTER (OUTPATIENT)
Dept: RADIATION ONCOLOGY | Facility: HOSPITAL | Age: 80
Setting detail: RADIATION/ONCOLOGY SERIES
Discharge: HOME | End: 2025-03-06
Payer: MEDICARE

## 2025-03-06 DIAGNOSIS — C61 MALIGNANT NEOPLASM OF PROSTATE (MULTI): ICD-10-CM

## 2025-03-06 DIAGNOSIS — Z51.0 ENCOUNTER FOR ANTINEOPLASTIC RADIATION THERAPY: ICD-10-CM

## 2025-03-06 LAB
RAD ONC MSQ ACTUAL FRACTIONS DELIVERED: 2
RAD ONC MSQ ACTUAL SESSION DELIVERED DOSE: 800 CGRAY
RAD ONC MSQ ACTUAL TOTAL DOSE: 1600 CGRAY
RAD ONC MSQ ELAPSED DAYS: 2
RAD ONC MSQ LAST DATE: NORMAL
RAD ONC MSQ PRESCRIBED FRACTIONAL DOSE: 800 CGRAY
RAD ONC MSQ PRESCRIBED NUMBER OF FRACTIONS: 5
RAD ONC MSQ PRESCRIBED TECHNIQUE: NORMAL
RAD ONC MSQ PRESCRIBED TOTAL DOSE: 4000 CGRAY
RAD ONC MSQ PRESCRIPTION PATTERN COMMENT: NORMAL
RAD ONC MSQ START DATE: NORMAL
RAD ONC MSQ TREATMENT COURSE NUMBER: 1
RAD ONC MSQ TREATMENT SITE: NORMAL

## 2025-03-06 PROCEDURE — 77373 STRTCTC BDY RAD THER TX DLVR: CPT | Performed by: RADIOLOGY

## 2025-03-10 ENCOUNTER — HOSPITAL ENCOUNTER (OUTPATIENT)
Dept: RADIATION ONCOLOGY | Facility: HOSPITAL | Age: 80
Setting detail: RADIATION/ONCOLOGY SERIES
Discharge: HOME | End: 2025-03-10
Payer: MEDICARE

## 2025-03-10 DIAGNOSIS — Z51.0 ENCOUNTER FOR ANTINEOPLASTIC RADIATION THERAPY: ICD-10-CM

## 2025-03-10 DIAGNOSIS — C61 MALIGNANT NEOPLASM OF PROSTATE (MULTI): ICD-10-CM

## 2025-03-10 LAB
RAD ONC MSQ ACTUAL FRACTIONS DELIVERED: 3
RAD ONC MSQ ACTUAL SESSION DELIVERED DOSE: 800 CGRAY
RAD ONC MSQ ACTUAL TOTAL DOSE: 2400 CGRAY
RAD ONC MSQ ELAPSED DAYS: 6
RAD ONC MSQ LAST DATE: NORMAL
RAD ONC MSQ PRESCRIBED FRACTIONAL DOSE: 800 CGRAY
RAD ONC MSQ PRESCRIBED NUMBER OF FRACTIONS: 5
RAD ONC MSQ PRESCRIBED TECHNIQUE: NORMAL
RAD ONC MSQ PRESCRIBED TOTAL DOSE: 4000 CGRAY
RAD ONC MSQ PRESCRIPTION PATTERN COMMENT: NORMAL
RAD ONC MSQ START DATE: NORMAL
RAD ONC MSQ TREATMENT COURSE NUMBER: 1
RAD ONC MSQ TREATMENT SITE: NORMAL

## 2025-03-10 PROCEDURE — 77373 STRTCTC BDY RAD THER TX DLVR: CPT | Performed by: STUDENT IN AN ORGANIZED HEALTH CARE EDUCATION/TRAINING PROGRAM

## 2025-03-12 ENCOUNTER — HOSPITAL ENCOUNTER (OUTPATIENT)
Dept: RADIATION ONCOLOGY | Facility: HOSPITAL | Age: 80
Setting detail: RADIATION/ONCOLOGY SERIES
Discharge: HOME | End: 2025-03-12
Payer: MEDICARE

## 2025-03-12 DIAGNOSIS — Z51.0 ENCOUNTER FOR ANTINEOPLASTIC RADIATION THERAPY: ICD-10-CM

## 2025-03-12 DIAGNOSIS — C61 MALIGNANT NEOPLASM OF PROSTATE (MULTI): ICD-10-CM

## 2025-03-12 LAB
RAD ONC MSQ ACTUAL FRACTIONS DELIVERED: 4
RAD ONC MSQ ACTUAL SESSION DELIVERED DOSE: 800 CGRAY
RAD ONC MSQ ACTUAL TOTAL DOSE: 3200 CGRAY
RAD ONC MSQ ELAPSED DAYS: 8
RAD ONC MSQ LAST DATE: NORMAL
RAD ONC MSQ PRESCRIBED FRACTIONAL DOSE: 800 CGRAY
RAD ONC MSQ PRESCRIBED NUMBER OF FRACTIONS: 5
RAD ONC MSQ PRESCRIBED TECHNIQUE: NORMAL
RAD ONC MSQ PRESCRIBED TOTAL DOSE: 4000 CGRAY
RAD ONC MSQ PRESCRIPTION PATTERN COMMENT: NORMAL
RAD ONC MSQ START DATE: NORMAL
RAD ONC MSQ TREATMENT COURSE NUMBER: 1
RAD ONC MSQ TREATMENT SITE: NORMAL

## 2025-03-12 PROCEDURE — 77336 RADIATION PHYSICS CONSULT: CPT | Performed by: STUDENT IN AN ORGANIZED HEALTH CARE EDUCATION/TRAINING PROGRAM

## 2025-03-12 PROCEDURE — 77373 STRTCTC BDY RAD THER TX DLVR: CPT | Performed by: STUDENT IN AN ORGANIZED HEALTH CARE EDUCATION/TRAINING PROGRAM

## 2025-03-14 ENCOUNTER — HOSPITAL ENCOUNTER (OUTPATIENT)
Dept: RADIATION ONCOLOGY | Facility: HOSPITAL | Age: 80
Setting detail: RADIATION/ONCOLOGY SERIES
Discharge: HOME | End: 2025-03-14
Payer: MEDICARE

## 2025-03-14 ENCOUNTER — DOCUMENTATION (OUTPATIENT)
Dept: RADIATION ONCOLOGY | Facility: HOSPITAL | Age: 80
End: 2025-03-14

## 2025-03-14 VITALS
HEART RATE: 105 BPM | RESPIRATION RATE: 18 BRPM | WEIGHT: 162.6 LBS | SYSTOLIC BLOOD PRESSURE: 113 MMHG | OXYGEN SATURATION: 98 % | DIASTOLIC BLOOD PRESSURE: 73 MMHG | HEIGHT: 69 IN | BODY MASS INDEX: 24.08 KG/M2 | TEMPERATURE: 96.6 F

## 2025-03-14 DIAGNOSIS — C61 PROSTATE CANCER (MULTI): Primary | ICD-10-CM

## 2025-03-14 DIAGNOSIS — C61 MALIGNANT NEOPLASM OF PROSTATE (MULTI): ICD-10-CM

## 2025-03-14 DIAGNOSIS — Z51.0 ENCOUNTER FOR ANTINEOPLASTIC RADIATION THERAPY: ICD-10-CM

## 2025-03-14 LAB
RAD ONC MSQ ACTUAL FRACTIONS DELIVERED: 5
RAD ONC MSQ ACTUAL SESSION DELIVERED DOSE: 800 CGRAY
RAD ONC MSQ ACTUAL TOTAL DOSE: 4000 CGRAY
RAD ONC MSQ ELAPSED DAYS: 10
RAD ONC MSQ LAST DATE: NORMAL
RAD ONC MSQ PRESCRIBED FRACTIONAL DOSE: 800 CGRAY
RAD ONC MSQ PRESCRIBED NUMBER OF FRACTIONS: 5
RAD ONC MSQ PRESCRIBED TECHNIQUE: NORMAL
RAD ONC MSQ PRESCRIBED TOTAL DOSE: 4000 CGRAY
RAD ONC MSQ PRESCRIPTION PATTERN COMMENT: NORMAL
RAD ONC MSQ START DATE: NORMAL
RAD ONC MSQ TREATMENT COURSE NUMBER: 1
RAD ONC MSQ TREATMENT SITE: NORMAL

## 2025-03-14 PROCEDURE — 77373 STRTCTC BDY RAD THER TX DLVR: CPT | Performed by: INTERNAL MEDICINE

## 2025-03-14 ASSESSMENT — PAIN SCALES - GENERAL: PAINLEVEL_OUTOF10: 0-NO PAIN

## 2025-03-14 NOTE — PROGRESS NOTES
"Radiation Oncology On Treatment Visit    Patient Name:  Boni Gongora  MRN:  [id number]  :  1945    Referring Provider: Jose David Gordon MD  Primary Care Provider: Sandro Morataya MD  Care Team: Patient Care Team:  Sandro Morataya MD as PCP - General (Internal Medicine)  Darrick Morataya MD as PCP - [facility]  Luis Dailey MD as Radiation Oncologist (Radiation Oncology)    Date of Service: 3/14/2025     Diagnosis:   Specialty Problems          Radiation Oncology Problems    Prostate cancer (Multi)         Treatment Summary:  Radiation Therapy    Treatment Period Technique Fraction Dose Fractions Total Dose   Course 1 3/4/2025-3/14/2025  (days elapsed: 10)         ProstSV 3/4/2025-3/14/2025 SBRT 800 / 800 cGy 5  4000 / 4,000 cGy     SUBJECTIVE: Doing well. Complains of mild weak stream. Reports urinary frequency roughly every two hours, urinary urgency. Reports baseline bowel movements twice a week, has increased to every other day and loose/watery.      OBJECTIVE:   Vital Signs:  /73   Pulse 105   Temp 35.9 °C (96.6 °F) (Temporal)   Resp 18   Ht 1.753 m (5' 9\")   Wt 73.8 kg (162 lb 9.6 oz)   SpO2 98%   BMI 24.01 kg/m²     Other Pertinent Findings:     Toxicity Assessment          3/14/2025    13:00   Toxicity Assessment   Adverse Events Reviewed (WDL) No (Exceptions to WDL)   Treatment Site Prostate RT   Diarrhea Grade 1       every other day has diarrhea about 3-4 small unformed movements   Fatigue Grade 0   Pain Grade 0   Proctitis Grade 0   Hematuria Grade 1   Urinary Incontinence Grade 0   Urinary Frequency Grade 1       slightly more frquent urine, about every 2 hours   Urinary Retention Grade 1       weaker stream   Urinary Tract Obstruction Grade 0   Urinary Tract Pain Grade 1       minor burining with urination   Urinary Urgency Grade 1       urgent urination        Assessment / Plan:  The patient is tolerating radiation therapy as anticipated.  Completed " RT today. Discussed that if he continues to have watery diarrhea to reach out to us. Discussed flomax, patient reports history of orthostatic hypotension with associated syncope and that his urinary symptoms are manageable without meds at this time. We discussed we will arrange follow up in 3 months with PSA at that time.

## 2025-03-24 DIAGNOSIS — E11.3299 DIABETIC RETINOPATHY, BACKGROUND (MULTI): ICD-10-CM

## 2025-03-26 RX ORDER — EMPAGLIFLOZIN 25 MG/1
25 TABLET, FILM COATED ORAL DAILY
Qty: 90 TABLET | Refills: 3 | Status: SHIPPED | OUTPATIENT
Start: 2025-03-26

## 2025-03-27 NOTE — PROGRESS NOTES
RADIATION COMPLETION OF THERAPY NOTE    Patient Name:  Boni Gongora  MRN:  [id number]  :  1945    Radiation Oncologist: Lauren Peacock MD PhD  Primary Care Provider: Sandro Morataya MD    Brief History: Boni Gongora is a 79 y.o. male with Cancer Staging   Prostate cancer (Multi)  Staging form: Prostate, AJCC 8th Edition  - Clinical stage from 2024: cT1c, cN0, PSA: 8.3, Grade Group: 2 - Signed by Lauren Peacock MD PhD on 2024      The patient completed radiotherapy as outlined below.    Radiation Treatment Summary:    Radiation Therapy    Treatment Period Technique Fraction Dose Fractions Total Dose   Course 1 3/4/2025-3/14/2025  (days elapsed: 10)         ProstSV 3/4/2025-3/14/2025 SBRT 800 / 800 cGy 5 / 5 4000 / 4,000 cGy     Concurrent Systemic Therapy: none    CTCAE Toxicity Overview:   Toxicity Assessment          3/14/2025    13:00   Toxicity Assessment   Adverse Events Reviewed (WDL) No (Exceptions to WDL)   Treatment Site Prostate RT   Diarrhea Grade 1       every other day has diarrhea about 3-4 small unformed movements   Fatigue Grade 0   Pain Grade 0   Proctitis Grade 0   Hematuria Grade 1   Urinary Incontinence Grade 0   Urinary Frequency Grade 1       slightly more frquent urine, about every 2 hours   Urinary Retention Grade 1       weaker stream   Urinary Tract Obstruction Grade 0   Urinary Tract Pain Grade 1       minor burining with urination   Urinary Urgency Grade 1       urgent urination     Patient Disposition: The patient will be scheduled for follow up at our clinic in 3 months with Davion Son NP with labs prior (orders placed). The patient is encouraged to contact the radiation department for any questions or concerns in the interim.    Future Appointments   Date Time Provider Department Center   4/10/2025  2:15 PM Maryjane Smith MD [id number] [facility]   2025  1:00 PM Sandro Morataya MD [id number] [facility]

## 2025-04-10 ENCOUNTER — APPOINTMENT (OUTPATIENT)
Dept: OPHTHALMOLOGY | Facility: CLINIC | Age: 80
End: 2025-04-10
Payer: MEDICARE

## 2025-04-10 DIAGNOSIS — E11.3299 DIABETIC RETINOPATHY, BACKGROUND (MULTI): Primary | ICD-10-CM

## 2025-04-10 PROCEDURE — 99213 OFFICE O/P EST LOW 20 MIN: CPT | Performed by: OPHTHALMOLOGY

## 2025-04-10 PROCEDURE — 92134 CPTRZ OPH DX IMG PST SGM RTA: CPT | Performed by: OPHTHALMOLOGY

## 2025-04-10 ASSESSMENT — TONOMETRY
IOP_METHOD: GOLDMANN APPLANATION
OD_IOP_MMHG: 17
OS_IOP_MMHG: 14

## 2025-04-10 ASSESSMENT — VISUAL ACUITY
CORRECTION_TYPE: GLASSES
OD_CC: 20/25
METHOD: SNELLEN - LINEAR
OS_CC+: -1
OD_CC+: -1
OS_CC: 20/30

## 2025-04-10 ASSESSMENT — SLIT LAMP EXAM - LIDS
COMMENTS: GOOD POSITION
COMMENTS: GOOD POSITION

## 2025-04-10 ASSESSMENT — CUP TO DISC RATIO
OS_RATIO: .3
OD_RATIO: .3

## 2025-04-10 ASSESSMENT — EXTERNAL EXAM - RIGHT EYE: OD_EXAM: NORMAL

## 2025-04-10 ASSESSMENT — EXTERNAL EXAM - LEFT EYE: OS_EXAM: NORMAL

## 2025-04-10 ASSESSMENT — ENCOUNTER SYMPTOMS: EYES NEGATIVE: 1

## 2025-04-10 NOTE — PROGRESS NOTES
Assessment/Plan   Diagnoses and all orders for this visit:  Diabetic retinopathy, background (Multi)  -     OCT, Retina - OU - Both Eyes  mild nonproliferative diabetic retinopathy without macular edema observed on exam today od/os, pt ed to continue good BGlc, blood pressure and lipid control, rtc with any changes in vision, otherwise monitor 1 year.

## 2025-06-24 ENCOUNTER — APPOINTMENT (OUTPATIENT)
Dept: PRIMARY CARE | Facility: CLINIC | Age: 80
End: 2025-06-24
Payer: MEDICARE

## 2025-07-01 ENCOUNTER — APPOINTMENT (OUTPATIENT)
Dept: LAB | Facility: HOSPITAL | Age: 80
End: 2025-07-01
Payer: MEDICARE

## 2025-07-01 ENCOUNTER — APPOINTMENT (OUTPATIENT)
Dept: PRIMARY CARE | Facility: CLINIC | Age: 80
End: 2025-07-01
Payer: MEDICARE

## 2025-07-01 VITALS
OXYGEN SATURATION: 97 % | SYSTOLIC BLOOD PRESSURE: 127 MMHG | HEART RATE: 75 BPM | TEMPERATURE: 97.1 F | BODY MASS INDEX: 23.85 KG/M2 | WEIGHT: 161 LBS | DIASTOLIC BLOOD PRESSURE: 71 MMHG | HEIGHT: 69 IN

## 2025-07-01 DIAGNOSIS — C61 PROSTATE CANCER (MULTI): ICD-10-CM

## 2025-07-01 DIAGNOSIS — E11.42 TYPE 2 DIABETES MELLITUS WITH DIABETIC POLYNEUROPATHY, WITHOUT LONG-TERM CURRENT USE OF INSULIN: ICD-10-CM

## 2025-07-01 DIAGNOSIS — Z98.84 BARIATRIC SURGERY STATUS: ICD-10-CM

## 2025-07-01 DIAGNOSIS — I10 BENIGN ESSENTIAL HYPERTENSION: ICD-10-CM

## 2025-07-01 DIAGNOSIS — E78.00 HYPERCHOLESTEROLEMIA: ICD-10-CM

## 2025-07-01 DIAGNOSIS — Z00.00 MEDICARE ANNUAL WELLNESS VISIT, SUBSEQUENT: Primary | ICD-10-CM

## 2025-07-01 DIAGNOSIS — G47.33 OSA (OBSTRUCTIVE SLEEP APNEA): ICD-10-CM

## 2025-07-01 PROBLEM — H26.492 POSTERIOR CAPSULAR OPACIFICATION VISUALLY SIGNIFICANT OF LEFT EYE: Status: RESOLVED | Noted: 2023-02-12 | Resolved: 2025-07-01

## 2025-07-01 PROBLEM — Z96.1 PSEUDOPHAKIA OF RIGHT EYE: Status: RESOLVED | Noted: 2023-02-12 | Resolved: 2025-07-01

## 2025-07-01 PROBLEM — K90.9 INTESTINAL MALABSORPTION: Status: RESOLVED | Noted: 2023-02-12 | Resolved: 2025-07-01

## 2025-07-01 PROBLEM — H02.209 LAGOPHTHALMOS: Status: RESOLVED | Noted: 2023-02-12 | Resolved: 2025-07-01

## 2025-07-01 PROBLEM — H53.2 BINOCULAR VISION DISORDER WITH DIPLOPIA: Status: RESOLVED | Noted: 2023-02-12 | Resolved: 2025-07-01

## 2025-07-01 PROBLEM — H50.21 HYPERTROPIA OF RIGHT EYE: Status: RESOLVED | Noted: 2023-02-12 | Resolved: 2025-07-01

## 2025-07-01 PROBLEM — H25.042 POSTERIOR SUBCAPSULAR POLAR AGE-RELATED CATARACT OF LEFT EYE: Status: RESOLVED | Noted: 2023-02-12 | Resolved: 2025-07-01

## 2025-07-01 PROBLEM — E11.3299 DIABETIC RETINOPATHY, BACKGROUND (MULTI): Status: RESOLVED | Noted: 2023-02-12 | Resolved: 2025-07-01

## 2025-07-01 PROBLEM — H50.30 XT (EXOTROPIA), INTERMITTENT: Status: RESOLVED | Noted: 2023-02-12 | Resolved: 2025-07-01

## 2025-07-01 PROBLEM — T14.8XXA CONTUSION: Status: RESOLVED | Noted: 2023-02-12 | Resolved: 2025-07-01

## 2025-07-01 PROBLEM — N20.0 KIDNEY STONE: Status: RESOLVED | Noted: 2023-02-12 | Resolved: 2025-07-01

## 2025-07-01 PROBLEM — S02.30XA: Status: RESOLVED | Noted: 2023-02-12 | Resolved: 2025-07-01

## 2025-07-01 PROBLEM — Z98.42 HISTORY OF YAG LASER CAPSULOTOMY OF LENS OF LEFT EYE: Status: RESOLVED | Noted: 2023-02-12 | Resolved: 2025-07-01

## 2025-07-01 PROBLEM — K29.70 HELICOBACTER POSITIVE GASTRITIS: Status: RESOLVED | Noted: 2023-02-12 | Resolved: 2025-07-01

## 2025-07-01 PROBLEM — H25.041 POSTERIOR SUBCAPSULAR POLAR AGE-RELATED CATARACT OF RIGHT EYE: Status: RESOLVED | Noted: 2023-02-12 | Resolved: 2025-07-01

## 2025-07-01 PROBLEM — E23.0 HYPOGONADOTROPIC HYPOGONADISM (MULTI): Status: RESOLVED | Noted: 2023-02-12 | Resolved: 2025-07-01

## 2025-07-01 PROBLEM — K21.9 GERD (GASTROESOPHAGEAL REFLUX DISEASE): Status: RESOLVED | Noted: 2023-02-12 | Resolved: 2025-07-01

## 2025-07-01 PROBLEM — Z98.41 HISTORY OF YAG LASER CAPSULOTOMY OF LENS OF RIGHT EYE: Status: RESOLVED | Noted: 2023-02-12 | Resolved: 2025-07-01

## 2025-07-01 PROBLEM — B96.81 HELICOBACTER POSITIVE GASTRITIS: Status: RESOLVED | Noted: 2023-02-12 | Resolved: 2025-07-01

## 2025-07-01 PROBLEM — H02.839 DERMATOCHALASIS: Status: RESOLVED | Noted: 2023-02-12 | Resolved: 2025-07-01

## 2025-07-01 PROBLEM — R55 SYNCOPE: Status: RESOLVED | Noted: 2023-02-12 | Resolved: 2025-07-01

## 2025-07-01 PROBLEM — L21.9 SEBORRHEIC DERMATITIS: Status: RESOLVED | Noted: 2023-02-12 | Resolved: 2025-07-01

## 2025-07-01 PROBLEM — Z96.1 PSEUDOPHAKIA OF LEFT EYE: Status: RESOLVED | Noted: 2023-02-12 | Resolved: 2025-07-01

## 2025-07-01 PROBLEM — N52.9 ERECTILE DYSFUNCTION: Status: RESOLVED | Noted: 2023-02-12 | Resolved: 2025-07-01

## 2025-07-01 PROBLEM — H40.041 STEROID RESPONDER, RIGHT EYE: Status: RESOLVED | Noted: 2023-02-12 | Resolved: 2025-07-01

## 2025-07-01 PROBLEM — H26.491 POSTERIOR CAPSULAR OPACIFICATION VISUALLY SIGNIFICANT OF RIGHT EYE: Status: RESOLVED | Noted: 2023-02-12 | Resolved: 2025-07-01

## 2025-07-01 PROBLEM — E61.1 IRON DEFICIENCY: Status: RESOLVED | Noted: 2023-02-12 | Resolved: 2025-07-01

## 2025-07-01 PROBLEM — R30.0 DYSURIA: Status: RESOLVED | Noted: 2023-02-12 | Resolved: 2025-07-01

## 2025-07-01 PROCEDURE — 3078F DIAST BP <80 MM HG: CPT | Performed by: STUDENT IN AN ORGANIZED HEALTH CARE EDUCATION/TRAINING PROGRAM

## 2025-07-01 PROCEDURE — 84153 ASSAY OF PSA TOTAL: CPT

## 2025-07-01 PROCEDURE — 3074F SYST BP LT 130 MM HG: CPT | Performed by: STUDENT IN AN ORGANIZED HEALTH CARE EDUCATION/TRAINING PROGRAM

## 2025-07-01 PROCEDURE — G0439 PPPS, SUBSEQ VISIT: HCPCS | Performed by: STUDENT IN AN ORGANIZED HEALTH CARE EDUCATION/TRAINING PROGRAM

## 2025-07-01 PROCEDURE — 1159F MED LIST DOCD IN RCRD: CPT | Performed by: STUDENT IN AN ORGANIZED HEALTH CARE EDUCATION/TRAINING PROGRAM

## 2025-07-01 PROCEDURE — 1170F FXNL STATUS ASSESSED: CPT | Performed by: STUDENT IN AN ORGANIZED HEALTH CARE EDUCATION/TRAINING PROGRAM

## 2025-07-01 PROCEDURE — 1126F AMNT PAIN NOTED NONE PRSNT: CPT | Performed by: STUDENT IN AN ORGANIZED HEALTH CARE EDUCATION/TRAINING PROGRAM

## 2025-07-01 PROCEDURE — 1036F TOBACCO NON-USER: CPT | Performed by: STUDENT IN AN ORGANIZED HEALTH CARE EDUCATION/TRAINING PROGRAM

## 2025-07-01 PROCEDURE — 1160F RVW MEDS BY RX/DR IN RCRD: CPT | Performed by: STUDENT IN AN ORGANIZED HEALTH CARE EDUCATION/TRAINING PROGRAM

## 2025-07-01 PROCEDURE — 99214 OFFICE O/P EST MOD 30 MIN: CPT | Performed by: STUDENT IN AN ORGANIZED HEALTH CARE EDUCATION/TRAINING PROGRAM

## 2025-07-01 PROCEDURE — 84154 ASSAY OF PSA FREE: CPT

## 2025-07-01 ASSESSMENT — ACTIVITIES OF DAILY LIVING (ADL)
TAKING_MEDICATION: INDEPENDENT
GROCERY_SHOPPING: INDEPENDENT
DOING_HOUSEWORK: INDEPENDENT
DRESSING: INDEPENDENT
BATHING: INDEPENDENT
MANAGING_FINANCES: INDEPENDENT

## 2025-07-01 ASSESSMENT — ENCOUNTER SYMPTOMS
LOSS OF SENSATION IN FEET: 1
OCCASIONAL FEELINGS OF UNSTEADINESS: 1
DEPRESSION: 0

## 2025-07-01 ASSESSMENT — PAIN SCALES - GENERAL: PAINLEVEL_OUTOF10: 0-NO PAIN

## 2025-07-01 NOTE — ASSESSMENT & PLAN NOTE
Orders:    Hemoglobin A1C; Future    CBC and Auto Differential; Future    Comprehensive Metabolic Panel; Future    Lipid Panel; Future    Microscopic Only, Urine; Future    Albumin-Creatinine Ratio, Urine Random; Future

## 2025-07-01 NOTE — PROGRESS NOTES
Subjective   Reason for Visit: Boni Gongora is an80 y.o. male who presents for a Medicare Wellness visit.    Past Medical, Surgical, and Family History reviewed and updated in chart.    Reviewed all medications by prescribing practitioner or clinical pharmacist (such as prescriptions, OTCs, herbal therapies and supplements) and documented in the medical record.    History of Present Illness  The patient is an 80-year-old male with diabetes and recent prostate cancer presenting today for a Medicare wellness visit and follow-up.    He has been managing his blood sugar levels effectively at home, with an A1c level of approximately 6.15. His continuous glucose monitor indicates an average blood glucose level below 140 over the past 60 days, and his reading this morning was 97. He is currently on Ozempic, Jardiance, and metformin for blood sugar control.    He underwent prostate cancer surgery in February 2025, performed by Dr. Currie, and subsequently received radiation therapy. He completed six radiation visits, including one for setup and five for treatment. He was informed that his PSA levels should decrease post-treatment.    He reports no feelings of depression, hopelessness, or sadness in the past two weeks. He recently returned from a 3-week vacation to the [facility]. He has experienced a few falls in the past year, which he attributes to his own actions, but none resulted in severe injury. He uses a cane for mobility on uneven surfaces but finds it hinders his movement on flat ground. He feels safe at home and rates his health as good considering his age. His daily caffeine intake includes 2 cups of regular coffee in the morning and a diet Coke once a day. He does not engage in regular exercise but is not sedentary. He uses hearing aids and is independent in bathing and dressing. He manages his finances, grocery shopping, medications, housework, and driving independently. He reports no memory issues  "and continues to play bridge. He does not require any medication refills at this time. He is interested in having his protein levels checked through blood work. He has been advised to avoid the current COVID-19 vaccine update. He typically receives his influenza vaccine in the fall and has managed to avoid siri COVID-19 over the past 5 years.    He takes ramipril 5 mg for blood pressure and kidney protection.    He takes simvastatin 20 mg for heart protection.    He takes gabapentin 700 mg daily for neuropathic pain.    He takes Prozac every other day for anxiety, which is generally well-controlled, although he still experiences some anxiety related to heights.    He has a history of sleep apnea and uses a CPAP machine, although he did not use it for most of June 2025 due to a malfunction.    He has thin skin and is seeking advice on how to thicken it.    PAST SURGICAL HISTORY:  Prostate cancer surgery in February 2025  Radiation therapy for prostate cancer in February 2025    SOCIAL HISTORY  He is a former smoker who quit 57 years ago. He does not drink alcohol or use drugs, except for prescribed pain medication.      PMHx, FHx, Social Hx, Surg Hx personally reviewed at this appointment. No pertinent findings and/or changes from prior (if applicable).    ROS: Denies wt gain/loss f/c HA LoC CP SOB NVDC. See HPI above, and scanned sheet (if applicable). All other systems are reviewed and are without complaint.       Objective     /71   Pulse 75   Temp 36.2 °C (97.1 °F)   Ht 1.753 m (5' 9\")   Wt 73 kg (161 lb)   SpO2 97%   BMI 23.78 kg/m²      Physical Exam  Gen: NAD. AAO x3.  HEENT: NC/AT. Anicteric sclera, symmetric pupils. MMM no thrush.  Neck: Soft, supple. No LAD. No goiter.  CV: RRR nl s1s2 no m/r/g  Pulm: CTAB no w/r/r, good air exchange  GI: soft NTND BS+ no hsm  Ext: WWP no edema  Neuro: II-XII grossly intact, nonfocal systemic findings  MSK: 5/5 strength b/l UE and LE  Gait: unremarkable "   Feet: no ulcers, nl monofilament testing       General Appearance: Normal.  Vital signs: Within normal limits.  HEENT: Within normal limits.  Respiratory: Clear to auscultation, no wheezing, rales or rhonchi.  Cardiovascular: Regular rate and rhythm, no murmurs, rubs, or gallops.  Gastrointestinal: Soft, no tenderness, no distention, no masses.  Extremities: Bruising noted on the arms.  Skin: Thin skin noted.  Neurological: Normal.  Psychiatric: Normal.  Other observations: No diabetic ulcers present.      Lab Results   Component Value Date    WBC 7.7 02/05/2025    HGB 14.7 02/05/2025    HCT 50.2 02/05/2025     (L) 02/05/2025    CHOL 132 06/12/2024    TRIG 131 06/12/2024    HDL 65.6 06/12/2024    ALT 12 02/05/2025    AST 11 02/05/2025     02/05/2025    K 4.9 02/05/2025     02/05/2025    CREATININE 0.79 02/05/2025    BUN 13 02/05/2025    CO2 30 02/05/2025    TSH 1.06 07/28/2022    PSA 10.24 (H) 01/27/2025    INR 1.0 01/26/2024    HGBA1C 6.1 (H) 12/10/2024     par     OCT, Retina - OU - Both Eyes  Right Eye  Findings include no diabetic retinopathy. Findings include normal foveal   contour.     Left Eye  Findings include no diabetic retinopathy. Findings include normal foveal   contour.     Notes  Retinal multimodal imaging including photography was completed, and the   findings are described in the examination.        Current Outpatient Medications   Medication Instructions    acetaminophen (Tylenol) 500 mg tablet Every 6 hours PRN    acetaminophen-codeine (Tylenol w/ Codeine #3) 300-30 mg tablet 2 tablets, 2 times daily PRN    cholecalciferol (VITAMIN D-3) 25 mcg, 2 times weekly    cyanocobalamin, vitamin B-12, 1,000 mcg tablet, sublingual 1 tablet, 2 times weekly    diphenhydrAMINE (BENADRYL) 25 mg, Nightly    diphenhydrAMINE-acetaminophen (Tylenol PM)  mg per tablet 1 tablet, Nightly PRN    ferrous sulfate 65 mg, 2 times weekly    flash glucose sensor (FREESTYLE MELVIN 2 SENSOR [facility])  Apply 1 sensor every 14 days to the back of upper arm    FLUoxetine (PROZAC) 20 mg, oral, Every other day    FreeStyle Agustin 2 Hope misc USE AS INSTRUCTED    FreeStyle Agustin 2 Sensor kit APPLY 1 SENSOR EVERY 14 DAYS TO THE BACK OF UPPER ARM.    gabapentin (NEURONTIN) 300 mg, oral, 2 times daily    gabapentin (NEURONTIN) 100 mg, oral, Nightly    Jardiance 25 mg, oral, Daily    metFORMIN (GLUCOPHAGE) 1,000 mg, oral, Take with food.    metroNIDAZOLE (Metrogel) 0.75 % gel Daily PRN    MULTIVITAMIN ORAL 1 tablet, Daily    omeprazole (PRILOSEC) 20 mg, Daily before breakfast    Ozempic 1 mg, subcutaneous, Once Weekly, SUNDAY    ramipril (ALTACE) 5 mg, oral, Daily    simvastatin (ZOCOR) 20 mg, oral, Every evening          Assessment & Plan  1. Diabetes Mellitus: Stable.  - Average blood glucose level below 140 over the past 60 days.  - Uses a continuous glucose monitor and reports morning glucose levels around 97.  - Blood work, including A1c, will be conducted to confirm the stability of diabetes.  - Medications: Ozempic, Jardiance, Metformin.    2. Prostate Cancer: Post-surgery.  - Underwent prostate cancer surgery in 02/2025 and completed five radiation sessions.  - PSA level test will be included in today's blood work to monitor the condition.  - Previous PSA levels expected to decrease post-radiation.    3. Anxiety: Controlled.  - Prozac taken every other day.  - Experiences some anxiety related to heights.    4. Hypertension: Controlled.  - Takes ramipril 5 mg for blood pressure and kidney protection.  - Blood pressure readings are within the desired range.    5. Hyperlipidemia.  - Takes simvastatin 20 mg for cholesterol management.    6. Neuropathic Pain.  - Takes gabapentin 700 mg daily for neuropathic pain.    7. Sleep Apnea.  - Uses a CPAP machine, experienced issues with it during recent vacation.  - CPAP machine functioning well at home.  - Will replace the hose to ensure proper function.    8. Skin  Fragility.  - Thin skin with some bruising on arms, no bleeding noted.  - Advised to be cautious to avoid bumping into objects.    9. Health Maintenance.  - Due for the RSV vaccine, which should be administered at the pharmacy.  - Advised to wait until the fall for the COVID-19 vaccine update.  - Regular flu shot planned for the fall.    Follow-up  - Blood work including A1c and PSA level test.    Assessment & Plan  Type 2 diabetes mellitus with diabetic polyneuropathy, without long-term current use of insulin    Orders:    Hemoglobin A1C; Future    CBC and Auto Differential; Future    Comprehensive Metabolic Panel; Future    Lipid Panel; Future    Microscopic Only, Urine; Future    Albumin-Creatinine Ratio, Urine Random; Future    Benign essential hypertension         Medicare annual wellness visit, subsequent         Prostate cancer (Multi)         Bariatric surgery status         Hypercholesterolemia         HEMANT (obstructive sleep apnea)              Sandro Morataya MD            This medical note was created with the assistance of artificial intelligence (AI) for documentation purposes. The content has been reviewed and confirmed by the healthcare provider for accuracy and completeness. Patient consented to the use of audio recording and use of AI during their visit.

## 2025-07-01 NOTE — PATIENT INSTRUCTIONS
Please stop at any  lab ([address] if you choose to use my building) to complete your blood and/or urine work that I've ordered for you.    I will contact you with the results at my soonest convenience. I strongly urge you to use [employer] as this is the quickest and easiest way to access your results and receive my correspondences.     Your medications are up to date today, I will renew them when a refill is due.     I recommend the following shots:  RSV and flu this fall    See me every 4-6 months.

## 2025-07-02 LAB
ALBUMIN SERPL-MCNC: 4.2 G/DL (ref 3.6–5.1)
ALBUMIN/CREAT UR: 3 MG/G CREAT
ALP SERPL-CCNC: 45 U/L (ref 35–144)
ALT SERPL-CCNC: 12 U/L (ref 9–46)
ANION GAP SERPL CALCULATED.4IONS-SCNC: 12 MMOL/L (CALC) (ref 7–17)
AST SERPL-CCNC: 15 U/L (ref 10–35)
BACTERIA #/AREA URNS HPF: NORMAL /HPF
BASOPHILS # BLD AUTO: 22 CELLS/UL (ref 0–200)
BASOPHILS NFR BLD AUTO: 0.4 %
BILIRUB SERPL-MCNC: 0.6 MG/DL (ref 0.2–1.2)
BUN SERPL-MCNC: 11 MG/DL (ref 7–25)
CALCIUM SERPL-MCNC: 9.3 MG/DL (ref 8.6–10.3)
CHLORIDE SERPL-SCNC: 105 MMOL/L (ref 98–110)
CHOLEST SERPL-MCNC: 144 MG/DL
CHOLEST/HDLC SERPL: 2.1 (CALC)
CO2 SERPL-SCNC: 26 MMOL/L (ref 20–32)
CREAT SERPL-MCNC: 0.88 MG/DL (ref 0.7–1.22)
CREAT UR-MCNC: 58 MG/DL (ref 20–320)
EGFRCR SERPLBLD CKD-EPI 2021: 87 ML/MIN/1.73M2
EOSINOPHIL # BLD AUTO: 157 CELLS/UL (ref 15–500)
EOSINOPHIL NFR BLD AUTO: 2.8 %
ERYTHROCYTE [DISTWIDTH] IN BLOOD BY AUTOMATED COUNT: 12.9 % (ref 11–15)
EST. AVERAGE GLUCOSE BLD GHB EST-MCNC: 140 MG/DL
EST. AVERAGE GLUCOSE BLD GHB EST-SCNC: 7.7 MMOL/L
GLUCOSE SERPL-MCNC: 167 MG/DL (ref 65–139)
HBA1C MFR BLD: 6.5 %
HCT VFR BLD AUTO: 43.1 % (ref 38.5–50)
HDLC SERPL-MCNC: 70 MG/DL
HGB BLD-MCNC: 13.9 G/DL (ref 13.2–17.1)
HYALINE CASTS #/AREA URNS LPF: NORMAL /LPF
LDLC SERPL CALC-MCNC: 59 MG/DL (CALC)
LYMPHOCYTES # BLD AUTO: 1730 CELLS/UL (ref 850–3900)
LYMPHOCYTES NFR BLD AUTO: 30.9 %
MCH RBC QN AUTO: 30.2 PG (ref 27–33)
MCHC RBC AUTO-ENTMCNC: 32.3 G/DL (ref 32–36)
MCV RBC AUTO: 93.7 FL (ref 80–100)
MICROALBUMIN UR-MCNC: 0.2 MG/DL
MONOCYTES # BLD AUTO: 594 CELLS/UL (ref 200–950)
MONOCYTES NFR BLD AUTO: 10.6 %
NEUTROPHILS # BLD AUTO: 3097 CELLS/UL (ref 1500–7800)
NEUTROPHILS NFR BLD AUTO: 55.3 %
NONHDLC SERPL-MCNC: 74 MG/DL (CALC)
PLATELET # BLD AUTO: 145 THOUSAND/UL (ref 140–400)
PMV BLD REES-ECKER: 11.5 FL (ref 7.5–12.5)
POTASSIUM SERPL-SCNC: 4.2 MMOL/L (ref 3.5–5.3)
PROT SERPL-MCNC: 6.8 G/DL (ref 6.1–8.1)
RBC # BLD AUTO: 4.6 MILLION/UL (ref 4.2–5.8)
RBC #/AREA URNS HPF: NORMAL /HPF
SERVICE CMNT-IMP: NORMAL
SODIUM SERPL-SCNC: 143 MMOL/L (ref 135–146)
SQUAMOUS #/AREA URNS HPF: NORMAL /HPF
TRIGL SERPL-MCNC: 72 MG/DL
WBC # BLD AUTO: 5.6 THOUSAND/UL (ref 3.8–10.8)
WBC #/AREA URNS HPF: NORMAL /HPF

## 2025-07-03 ENCOUNTER — TELEPHONE (OUTPATIENT)
Dept: RADIATION ONCOLOGY | Facility: HOSPITAL | Age: 80
End: 2025-07-03
Payer: MEDICARE

## 2025-07-03 DIAGNOSIS — E11.42 TYPE 2 DIABETES MELLITUS WITH DIABETIC POLYNEUROPATHY, WITHOUT LONG-TERM CURRENT USE OF INSULIN: Primary | ICD-10-CM

## 2025-07-03 LAB
PSA FREE MFR SERPL: 17 %
PSA FREE SERPL-MCNC: 0.1 NG/ML
PSA SERPL IA-MCNC: 0.6 NG/ML (ref 0–4)

## 2025-07-03 RX ORDER — FLASH GLUCOSE SENSOR
KIT MISCELLANEOUS
Qty: 1 EACH | Refills: 11 | Status: SHIPPED | OUTPATIENT
Start: 2025-07-03

## 2025-07-07 DIAGNOSIS — E11.42 TYPE 2 DIABETES MELLITUS WITH DIABETIC POLYNEUROPATHY, WITHOUT LONG-TERM CURRENT USE OF INSULIN: ICD-10-CM

## 2025-07-07 RX ORDER — FLASH GLUCOSE SCANNING READER
EACH MISCELLANEOUS
Qty: 6 EACH | Refills: 3 | Status: SHIPPED | OUTPATIENT
Start: 2025-07-07

## 2025-07-07 NOTE — PROGRESS NOTES
Cancer synopsis:  Rad/onc: Huy TENACNDULCE    79 y/o male w/ cT1c, cN0, PSA: 8.3, Grade Group: 2 prostate ca    Prostate ca hx:    7/5/2023 PSA 7.85     11/7/2023 MRI prostate noted a 34 g gland, PI-RADS 4 in the left PZ abutting the medial left aspect of the last left SV.  Nonenlarged mesorectal lymph nodes, indeterminant.     2/6/2024 targeted and systematic biopsy noted GG2, noted in 1 of 4 systematic cores (<50% cores), and 1/1 targeted core+      6/12/2024 PSA 8.3    SBRT to prostate and SV 03/14/2025    History of presenting illness:    Patient ID: [id number]     Boni Gongora is a 80 y.o. male who presents for his FIR prostate ca cT1c, cN0, PSA: 8.3, Grade Group: 2 now s/p SBRT.    RT Site: prostate and SV  RT Date: 03/14/2025  Hormone therapy: no  Hot Flushes: Denies  Fatigue: Denies  Bone pain: Denies  ED: Denies changes  - Quality of erections during the last 4 weeks: 2 = Not firm enough for any sexual activity  - Use of erectile dysfunction medications:  None  IPSS: 0  Urinary symptoms: Denies dysuria, hematuria, leakage, urgency  and or frequnecy.  Urinary Medications: No  Rectal bleeding: Denies  Colonoscopy: 04/2022 negative next due now  Other systems: Denies    Review of systems:  Review of Systems   Constitutional:  Negative for fatigue, fever and unexpected weight change.   Respiratory:  Negative for cough, chest tightness and shortness of breath.    Cardiovascular:  Negative for chest pain, palpitations and leg swelling.   Gastrointestinal:  Negative for abdominal pain, anal bleeding, blood in stool, constipation, diarrhea and rectal pain.   Endocrine: Negative for cold intolerance, heat intolerance and polyuria.   Genitourinary:  Negative for decreased urine volume, difficulty urinating, dysuria, frequency, hematuria and urgency.   Musculoskeletal:  Negative for arthralgias, back pain, gait problem and joint swelling.   Skin: Negative.    Allergic/Immunologic: Negative.    Neurological:   Negative for dizziness, syncope and weakness.   Psychiatric/Behavioral: Negative.         PERFORMANCE STATUS:  KPS/ECO, Fully active, able to carry on all pre-disease performed without restriction (ECOG equivalent 0)    Past Medical history  Medical History[1]     Surgical/family history  Family History[2]   Surgical History[3]     Social History  Tobacco Use: Medium Risk (2025)    Patient History     Smoking Tobacco Use: Former     Smokeless Tobacco Use: Never     Passive Exposure: Past       Current med list:  Current Outpatient Medications   Medication Instructions    acetaminophen (Tylenol) 500 mg tablet Every 6 hours PRN    acetaminophen-codeine (Tylenol w/ Codeine #3) 300-30 mg tablet 2 tablets, 2 times daily PRN    blood-glucose sensor (FreeStyle Agustin 3 Plus Sensor) device 1 each, miscellaneous, Every 14 days    cholecalciferol (VITAMIN D-3) 25 mcg, 2 times weekly    cyanocobalamin, vitamin B-12, 1,000 mcg tablet, sublingual 1 tablet, 2 times weekly    diphenhydrAMINE (BENADRYL) 25 mg, Nightly    diphenhydrAMINE-acetaminophen (Tylenol PM)  mg per tablet 1 tablet, Nightly PRN    ferrous sulfate 65 mg, 2 times weekly    flash glucose sensor (FREESTYLE AGUSTIN 2 SENSOR [facility]) Apply 1 sensor every 14 days to the back of upper arm    FLUoxetine (PROZAC) 20 mg, oral, Every other day    FreeStyle Agustin 2 [place] misc Use as instructed    FreeStyle Agustin 2 Sensor kit Use as instructed    gabapentin (NEURONTIN) 300 mg, oral, 2 times daily    gabapentin (NEURONTIN) 100 mg, oral, Nightly    Jardiance 25 mg, oral, Daily    metFORMIN (GLUCOPHAGE) 1,000 mg, oral, Take with food.    metroNIDAZOLE (Metrogel) 0.75 % gel Daily PRN    MULTIVITAMIN ORAL 1 tablet, Daily    omeprazole (PRILOSEC) 20 mg, Daily before breakfast    Ozempic 1 mg, subcutaneous, Once Weekly,     ramipril (ALTACE) 5 mg, oral, Daily    simvastatin (ZOCOR) 20 mg, oral, Every evening      Last recorded vital:  /71   Pulse 69   Temp  36 °C (96.8 °F) (Temporal)   Resp 18   Wt 75 kg (165 lb 6.4 oz)   SpO2 99%   BMI 24.43 kg/m²     Physical exam  Physical Exam  Constitutional:       Appearance: Normal appearance.   Cardiovascular:      Rate and Rhythm: Normal rate.   Pulmonary:      Effort: Pulmonary effort is normal.      Breath sounds: Normal breath sounds.   Musculoskeletal:         General: Normal range of motion.      Cervical back: Normal range of motion.   Neurological:      Mental Status: He is alert and oriented to person, place, and time.   Psychiatric:         Mood and Affect: Mood normal.         Behavior: Behavior normal.         Thought Content: Thought content normal.         Judgment: Judgment normal.         Pertinent labs:  PSA, TOTAL   Date/Time Value Ref Range Status   01/27/2025 12:34 PM 10.24 (H) < OR = 4.00 ng/mL Final     Comment:     The total PSA value from this assay system is   standardized against the WHO standard. The test   result will be approximately 20% lower when compared   to the equimolar-standardized total PSA (Danelle   Salazar). Comparison of serial PSA results should be   interpreted with this fact in mind.     This test was performed using the Siemens   chemiluminescent method. Values obtained from   different assay methods cannot be used  interchangeably. PSA levels, regardless of  value, should not be interpreted as absolute  evidence of the presence or absence of disease.       PSA   Date/Time Value Ref Range Status   07/01/2025 01:39 PM 0.6 0.0 - 4.0 ng/mL Final     Comment:     INTERPRETIVE INFORMATION: Prostate Specific Antigen    The Roche PSA electrochemiluminescent immunoassay is used. Results   obtained with different test methods or kits cannot be used   interchangeably. The Roche PSA method is approved for use as an   aid in the detection of prostate cancer when used in conjunction   with a digital rectal exam in individuals with a prostate age 50   years and older. The Roche PSA is also  indicated for the serial   measurement of PSA to aid in the prognosis and management of   prostate cancer patients. Elevated PSA concentrations can only   suggest the presence of prostate cancer until biopsy is performed.   PSA concentrations can also be elevated in benign prostatic   hyperplasia or inflammatory conditions of the prostate. PSA is   generally not elevated in healthy individuals or individuals with   nonprostatic carcinoma.     Dx:  Problem List Items Addressed This Visit       Prostate cancer (Multi) - Primary    Relevant Orders    Prostate Specific Antigen    Clinic Appointment Request Follow Up; DAVION GOLDEN; [facility] [address] [facility]    Clinic Appointment Request Follow Up; DAVION GOLDEN; [facility] [address] [facility] (Completed)    PSA of 0.60 was reviewed and is declining nicely after RT. Review of latent SE including rectal bleeding, hematuria, urinary strictures, ED where reviewed as well as how to contact office if s/s present. Denies latent SE. NCCN guidelines where reviewed and routine FUV of every 3m for first year and every 6m for four years for a total of five years was discussed. Patient verbalized understanding.      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 Continue established treatment plan including control of risk factors. Follow up at least yearly       Diplopia 05/11/2016    Binocular vision disorder with diplopia     Diplopia 05/10/2016    Binocular vision disorder with diplopia    Diplopia 05/10/2016    Binocular vision disorder with diplopia    Diplopia 05/10/2016    Binocular vision disorder with diplopia    Diplopia 05/10/2016    Binocular vision disorder with diplopia    GERD (gastroesophageal reflux disease)     Hesitancy of micturition 09/16/2015    Hesitancy of micturition    History of YAG laser capsulotomy of lens of left eye 02/12/2023    HL (hearing loss) 2010    Hyperlipidemia     Hypertension     Iron deficiency 02/12/2023    Personal history of other endocrine, nutritional and metabolic disease     History of type 2 diabetes mellitus    Personal history of other mental and behavioral disorders 03/24/2014    History of anxiety disorder    Prostate cancer (Multi)     Pseudophakia of left eye 02/12/2023    Pseudophakia of right eye 02/12/2023    Sleep apnea     Type 2 diabetes mellitus     Urinary tract infection 1980    Urinary tract infection, site not specified 10/28/2019    Acute UTI (urinary tract infection)    Vitamin D deficiency, unspecified 06/04/2014    Vitamin D deficiency    XT (exotropia), intermittent 02/12/2023   [2]   Family History  Problem Relation Name Age of Onset    Lymphoma Mother Andra Gongora         non-hodgkins    Cancer Mother Andra Gongora     Diabetes Father Seng    [3]   Past Surgical History:  Procedure Laterality Date    ADENOIDECTOMY      BARIATRIC SURGERY  04/16/2014    CIRCUMCISION, PRIMARY  1945    FRACTURE SURGERY  08/1982    KIDNEY STONE SURGERY      KNEE SURGERY  08/18/2014    Knee Surgery    OTHER SURGICAL HISTORY  06/09/2021    Cataract surgery    OTHER SURGICAL HISTORY  04/18/2022    Gastric Surgery For Morbid Obesity Laparoscopic Longitudinal Gastrectomy    TONSILLECTOMY  1950

## 2025-07-08 ENCOUNTER — HOSPITAL ENCOUNTER (OUTPATIENT)
Dept: RADIATION ONCOLOGY | Facility: HOSPITAL | Age: 80
Setting detail: RADIATION/ONCOLOGY SERIES
Discharge: HOME | End: 2025-07-08
Payer: MEDICARE

## 2025-07-08 VITALS
SYSTOLIC BLOOD PRESSURE: 124 MMHG | HEART RATE: 69 BPM | RESPIRATION RATE: 18 BRPM | TEMPERATURE: 96.8 F | DIASTOLIC BLOOD PRESSURE: 71 MMHG | WEIGHT: 165.4 LBS | OXYGEN SATURATION: 99 % | BODY MASS INDEX: 24.43 KG/M2

## 2025-07-08 DIAGNOSIS — C61 PROSTATE CANCER (MULTI): Primary | ICD-10-CM

## 2025-07-08 DIAGNOSIS — E11.42 TYPE 2 DIABETES MELLITUS WITH DIABETIC POLYNEUROPATHY, WITHOUT LONG-TERM CURRENT USE OF INSULIN: ICD-10-CM

## 2025-07-08 DIAGNOSIS — E11.42 TYPE 2 DIABETES MELLITUS WITH DIABETIC POLYNEUROPATHY, WITHOUT LONG-TERM CURRENT USE OF INSULIN: Primary | ICD-10-CM

## 2025-07-08 PROCEDURE — 99214 OFFICE O/P EST MOD 30 MIN: CPT

## 2025-07-08 RX ORDER — BLOOD-GLUCOSE SENSOR
1 EACH MISCELLANEOUS
Qty: 3 EACH | Refills: 11 | Status: SHIPPED | OUTPATIENT
Start: 2025-07-08 | End: 2025-07-09

## 2025-07-08 ASSESSMENT — ENCOUNTER SYMPTOMS
UNEXPECTED WEIGHT CHANGE: 0
BACK PAIN: 0
PSYCHIATRIC NEGATIVE: 1
CONSTIPATION: 0
CHEST TIGHTNESS: 0
ALLERGIC/IMMUNOLOGIC NEGATIVE: 1
JOINT SWELLING: 0
ARTHRALGIAS: 0
DIARRHEA: 0
ABDOMINAL PAIN: 0
SHORTNESS OF BREATH: 0
HEMATURIA: 0
DEPRESSION: 0
WEAKNESS: 0
RECTAL PAIN: 0
DYSURIA: 0
OCCASIONAL FEELINGS OF UNSTEADINESS: 0
ANAL BLEEDING: 0
FREQUENCY: 0
BLOOD IN STOOL: 0
COUGH: 0
DIZZINESS: 0
FEVER: 0
PALPITATIONS: 0
FATIGUE: 0
LOSS OF SENSATION IN FEET: 0
DIFFICULTY URINATING: 0

## 2025-07-08 ASSESSMENT — COLUMBIA-SUICIDE SEVERITY RATING SCALE - C-SSRS
2. HAVE YOU ACTUALLY HAD ANY THOUGHTS OF KILLING YOURSELF?: NO
1. IN THE PAST MONTH, HAVE YOU WISHED YOU WERE DEAD OR WISHED YOU COULD GO TO SLEEP AND NOT WAKE UP?: NO
6. HAVE YOU EVER DONE ANYTHING, STARTED TO DO ANYTHING, OR PREPARED TO DO ANYTHING TO END YOUR LIFE?: NO

## 2025-07-08 ASSESSMENT — PATIENT HEALTH QUESTIONNAIRE - PHQ9
2. FEELING DOWN, DEPRESSED OR HOPELESS: NOT AT ALL
1. LITTLE INTEREST OR PLEASURE IN DOING THINGS: NOT AT ALL
SUM OF ALL RESPONSES TO PHQ9 QUESTIONS 1 AND 2: 0

## 2025-07-08 ASSESSMENT — PAIN SCALES - GENERAL: PAINLEVEL_OUTOF10: 0-NO PAIN

## 2025-07-09 RX ORDER — BLOOD-GLUCOSE SENSOR
1 EACH MISCELLANEOUS
Qty: 3 EACH | Refills: 11 | Status: SHIPPED | OUTPATIENT
Start: 2025-07-09

## 2025-07-16 DIAGNOSIS — E11.42 TYPE 2 DIABETES MELLITUS WITH DIABETIC POLYNEUROPATHY, WITHOUT LONG-TERM CURRENT USE OF INSULIN: Primary | ICD-10-CM

## 2025-07-16 RX ORDER — BLOOD-GLUCOSE,RECEIVER,CONT
EACH MISCELLANEOUS
Qty: 1 EACH | Refills: 0 | Status: SHIPPED | OUTPATIENT
Start: 2025-07-16

## 2025-11-04 ENCOUNTER — APPOINTMENT (OUTPATIENT)
Dept: PRIMARY CARE | Facility: CLINIC | Age: 80
End: 2025-11-04
Payer: MEDICARE

## 2026-04-21 ENCOUNTER — APPOINTMENT (OUTPATIENT)
Dept: OPHTHALMOLOGY | Facility: CLINIC | Age: 81
End: 2026-04-21
Payer: MEDICARE

## (undated) DEVICE — MASK, AURASTRAIGHT, LARYN, SIZE 3

## (undated) DEVICE — APPLICATOR, CHLORAPREP, W/ORANGE TINT, 26ML

## (undated) DEVICE — Device

## (undated) DEVICE — SYSTEM, SPACEOAR VUE, HYDROGEL

## (undated) DEVICE — SYRINGE, 60 CC, IRRIGATION, TOOMEY TIP

## (undated) DEVICE — GLOVE, SURGICAL, PROTEXIS PI , 7.5, PF, LF

## (undated) DEVICE — DRESSING, NON-ADHERENT, CURAD, ABSORBENT, 3 X 8 IN, STERILE

## (undated) DEVICE — GOWN, SURGICAL, SIRUS, NON REINFORCED, LARGE

## (undated) DEVICE — MASK, AURAGAIN, LARYN, SIZE 4.0

## (undated) DEVICE — 20GA X 15CM, CHIBA-STYLE NEEDLE

## (undated) DEVICE — BLANKET, LOWER BODY, VHA PLUS, ADULT

## (undated) DEVICE — MASK, AURASTRAIGHT, LARYN, SIZE 5

## (undated) DEVICE — NEEDLE, BIOPSY, MAX CORE, 18G

## (undated) DEVICE — SOLUTION, IRRIGATION, X RX SODIUM CHL 0.9%, 1000ML BTL

## (undated) DEVICE — PERINEOLOGIC PRECISIONPOINT TRANSPERINEAL ACCESS, BIOPSY NEEDLE GUIDE (P1001)

## (undated) DEVICE — SYRINGE, 50 CC, IRRIGATION, CATHETER TIP, DISPOSABLE, STERILE, LF